# Patient Record
Sex: FEMALE | Race: WHITE | NOT HISPANIC OR LATINO | URBAN - METROPOLITAN AREA
[De-identification: names, ages, dates, MRNs, and addresses within clinical notes are randomized per-mention and may not be internally consistent; named-entity substitution may affect disease eponyms.]

---

## 2019-10-01 ENCOUNTER — EMERGENCY (EMERGENCY)
Facility: HOSPITAL | Age: 54
LOS: 1 days | Discharge: HOME | End: 2019-10-01
Attending: EMERGENCY MEDICINE
Payer: COMMERCIAL

## 2019-10-01 VITALS
RESPIRATION RATE: 20 BRPM | OXYGEN SATURATION: 97 % | HEIGHT: 67 IN | DIASTOLIC BLOOD PRESSURE: 76 MMHG | TEMPERATURE: 98 F | WEIGHT: 141.1 LBS | HEART RATE: 52 BPM | SYSTOLIC BLOOD PRESSURE: 121 MMHG

## 2019-10-01 PROCEDURE — 99283 EMERGENCY DEPT VISIT LOW MDM: CPT | Mod: 25

## 2019-10-01 PROCEDURE — 12001 RPR S/N/AX/GEN/TRNK 2.5CM/<: CPT

## 2019-10-01 PROCEDURE — 12001 RPR S/N/AX/GEN/TRNK 2.5CM/<: CPT | Mod: F6

## 2019-10-01 RX ORDER — IBUPROFEN 200 MG
1 TABLET ORAL
Qty: 20 | Refills: 0
Start: 2019-10-01 | End: 2019-10-05

## 2019-10-01 NOTE — ED PROVIDER NOTE - ATTENDING CONTRIBUTION TO CARE
55 yo F Pt, UTD tetanus, presents to ED complaining of finger laceration x 1 hr. Pt states she cut her right pointer finger on a  while cooking.  from finger  laceration finger no tendon plan wound closure

## 2019-10-01 NOTE — ED ADULT TRIAGE NOTE - CHIEF COMPLAINT QUOTE
pt c/o was pureeing stuck her finger into clean it out.   multiple laceration,cleaned with peroxide.

## 2019-10-01 NOTE — ED PROVIDER NOTE - PATIENT PORTAL LINK FT
You can access the FollowMyHealth Patient Portal offered by [facility] by registering at the following website: [email or web address]. By joining [employer]’s FollowMyHealth portal, you will also be able to view your health information using other applications (apps) compatible with our system.

## 2019-10-01 NOTE — ED PROVIDER NOTE - OBJECTIVE STATEMENT
55 yo F Pt, UTD tetanus, presents to ED complaining of finger laceration x 1 hr. Pt states she cut her right pointer finger on a  while cooking. Pt admits to bleeding. Pt denies numbness, tingling, weakness, and any other acute symptoms at this time.

## 2019-10-01 NOTE — ED PROVIDER NOTE - PROGRESS NOTE DETAILS
laceration repaired without complication. Pt educated on wound care. PT verbalized understanding of diagnosis and importance of follow up at PMD. PT educated on importance of follow up and when to return to the ED. pinsky f/u given as needed

## 2020-07-18 ENCOUNTER — INPATIENT (INPATIENT)
Facility: HOSPITAL | Age: 55
LOS: 3 days | Discharge: HOME | DRG: 392 | End: 2020-07-22
Attending: FAMILY MEDICINE | Admitting: FAMILY MEDICINE
Payer: COMMERCIAL

## 2020-07-18 VITALS
OXYGEN SATURATION: 99 % | WEIGHT: 145.95 LBS | HEIGHT: 64 IN | HEART RATE: 76 BPM | RESPIRATION RATE: 18 BRPM | SYSTOLIC BLOOD PRESSURE: 116 MMHG | TEMPERATURE: 98 F | DIASTOLIC BLOOD PRESSURE: 78 MMHG

## 2020-07-18 DIAGNOSIS — R89.9 UNSPECIFIED ABNORMAL FINDING IN SPECIMENS FROM OTHER ORGANS, SYSTEMS AND TISSUES: ICD-10-CM

## 2020-07-18 LAB
ALBUMIN SERPL ELPH-MCNC: 4.3 G/DL — SIGNIFICANT CHANGE UP (ref 3.3–5.2)
ALP SERPL-CCNC: 53 U/L — SIGNIFICANT CHANGE UP (ref 40–120)
ALT FLD-CCNC: 16 U/L — SIGNIFICANT CHANGE UP
ANION GAP SERPL CALC-SCNC: 12 MMOL/L — SIGNIFICANT CHANGE UP (ref 5–17)
ANISOCYTOSIS BLD QL: SLIGHT — SIGNIFICANT CHANGE UP
APPEARANCE UR: CLEAR — SIGNIFICANT CHANGE UP
AST SERPL-CCNC: 28 U/L — SIGNIFICANT CHANGE UP
BACTERIA # UR AUTO: ABNORMAL
BASOPHILS # BLD AUTO: 0.02 K/UL — SIGNIFICANT CHANGE UP (ref 0–0.2)
BASOPHILS NFR BLD AUTO: 0.9 % — SIGNIFICANT CHANGE UP (ref 0–2)
BILIRUB SERPL-MCNC: 0.3 MG/DL — LOW (ref 0.4–2)
BILIRUB UR-MCNC: NEGATIVE — SIGNIFICANT CHANGE UP
BLASTS # FLD: 0.9 % — HIGH (ref 0–0)
BUN SERPL-MCNC: 15 MG/DL — SIGNIFICANT CHANGE UP (ref 8–20)
CALCIUM SERPL-MCNC: 9.5 MG/DL — SIGNIFICANT CHANGE UP (ref 8.6–10.2)
CHLORIDE SERPL-SCNC: 100 MMOL/L — SIGNIFICANT CHANGE UP (ref 98–107)
CO2 SERPL-SCNC: 26 MMOL/L — SIGNIFICANT CHANGE UP (ref 22–29)
COLOR SPEC: YELLOW — SIGNIFICANT CHANGE UP
CREAT SERPL-MCNC: 0.68 MG/DL — SIGNIFICANT CHANGE UP (ref 0.5–1.3)
DIFF PNL FLD: ABNORMAL
ELLIPTOCYTES BLD QL SMEAR: SLIGHT — SIGNIFICANT CHANGE UP
EOSINOPHIL # BLD AUTO: 0.02 K/UL — SIGNIFICANT CHANGE UP (ref 0–0.5)
EOSINOPHIL NFR BLD AUTO: 0.9 % — SIGNIFICANT CHANGE UP (ref 0–6)
EPI CELLS # UR: ABNORMAL
GIANT PLATELETS BLD QL SMEAR: PRESENT — SIGNIFICANT CHANGE UP
GLUCOSE SERPL-MCNC: 100 MG/DL — HIGH (ref 70–99)
GLUCOSE UR QL: NEGATIVE — SIGNIFICANT CHANGE UP
HCG SERPL-ACNC: <4 MIU/ML — SIGNIFICANT CHANGE UP
HCT VFR BLD CALC: 44.8 % — SIGNIFICANT CHANGE UP (ref 34.5–45)
HGB BLD-MCNC: 14.7 G/DL — SIGNIFICANT CHANGE UP (ref 11.5–15.5)
KETONES UR-MCNC: ABNORMAL
LACTATE BLDV-MCNC: 0.6 MMOL/L — SIGNIFICANT CHANGE UP (ref 0.5–2)
LACTATE BLDV-MCNC: 1.1 MMOL/L — SIGNIFICANT CHANGE UP (ref 0.5–2)
LEUKOCYTE ESTERASE UR-ACNC: ABNORMAL
LIDOCAIN IGE QN: 19 U/L — LOW (ref 22–51)
LYMPHOCYTES # BLD AUTO: 0.75 K/UL — LOW (ref 1–3.3)
LYMPHOCYTES # BLD AUTO: 28.7 % — SIGNIFICANT CHANGE UP (ref 13–44)
MACROCYTES BLD QL: SLIGHT — SIGNIFICANT CHANGE UP
MANUAL SMEAR VERIFICATION: SIGNIFICANT CHANGE UP
MCHC RBC-ENTMCNC: 29.4 PG — SIGNIFICANT CHANGE UP (ref 27–34)
MCHC RBC-ENTMCNC: 32.8 GM/DL — SIGNIFICANT CHANGE UP (ref 32–36)
MCV RBC AUTO: 89.6 FL — SIGNIFICANT CHANGE UP (ref 80–100)
MICROCYTES BLD QL: SLIGHT — SIGNIFICANT CHANGE UP
MONOCYTES # BLD AUTO: 0.46 K/UL — SIGNIFICANT CHANGE UP (ref 0–0.9)
MONOCYTES NFR BLD AUTO: 17.6 % — HIGH (ref 2–14)
MYELOCYTES NFR BLD: 0.9 % — HIGH (ref 0–0)
NEUTROPHILS # BLD AUTO: 1.05 K/UL — LOW (ref 1.8–7.4)
NEUTROPHILS NFR BLD AUTO: 31.5 % — LOW (ref 43–77)
NEUTS BAND # BLD: 8.4 % — HIGH (ref 0–8)
NITRITE UR-MCNC: POSITIVE
OVALOCYTES BLD QL SMEAR: SLIGHT — SIGNIFICANT CHANGE UP
PH UR: 6 — SIGNIFICANT CHANGE UP (ref 5–8)
PLAT MORPH BLD: NORMAL — SIGNIFICANT CHANGE UP
PLATELET # BLD AUTO: 144 K/UL — LOW (ref 150–400)
PLATELET COUNT - ESTIMATE: NORMAL — SIGNIFICANT CHANGE UP
POLYCHROMASIA BLD QL SMEAR: SLIGHT — SIGNIFICANT CHANGE UP
POTASSIUM SERPL-MCNC: 4.6 MMOL/L — SIGNIFICANT CHANGE UP (ref 3.5–5.3)
POTASSIUM SERPL-SCNC: 4.6 MMOL/L — SIGNIFICANT CHANGE UP (ref 3.5–5.3)
PROT SERPL-MCNC: 7 G/DL — SIGNIFICANT CHANGE UP (ref 6.6–8.7)
PROT UR-MCNC: 30 MG/DL
RBC # BLD: 5 M/UL — SIGNIFICANT CHANGE UP (ref 3.8–5.2)
RBC # FLD: 12.7 % — SIGNIFICANT CHANGE UP (ref 10.3–14.5)
RBC BLD AUTO: NORMAL — SIGNIFICANT CHANGE UP
RBC CASTS # UR COMP ASSIST: SIGNIFICANT CHANGE UP /HPF (ref 0–4)
SMUDGE CELLS # BLD: PRESENT — SIGNIFICANT CHANGE UP
SODIUM SERPL-SCNC: 138 MMOL/L — SIGNIFICANT CHANGE UP (ref 135–145)
SP GR SPEC: 1.02 — SIGNIFICANT CHANGE UP (ref 1.01–1.02)
UROBILINOGEN FLD QL: NEGATIVE — SIGNIFICANT CHANGE UP
VARIANT LYMPHS # BLD: 10.2 % — HIGH (ref 0–6)
WBC # BLD: 2.62 K/UL — LOW (ref 3.8–10.5)
WBC # FLD AUTO: 2.62 K/UL — LOW (ref 3.8–10.5)
WBC UR QL: ABNORMAL

## 2020-07-18 PROCEDURE — 74177 CT ABD & PELVIS W/CONTRAST: CPT | Mod: 26

## 2020-07-18 PROCEDURE — 99285 EMERGENCY DEPT VISIT HI MDM: CPT

## 2020-07-18 PROCEDURE — 99223 1ST HOSP IP/OBS HIGH 75: CPT

## 2020-07-18 RX ORDER — SODIUM CHLORIDE 9 MG/ML
1000 INJECTION, SOLUTION INTRAVENOUS
Refills: 0 | Status: DISCONTINUED | OUTPATIENT
Start: 2020-07-18 | End: 2020-07-20

## 2020-07-18 RX ORDER — CIPROFLOXACIN LACTATE 400MG/40ML
400 VIAL (ML) INTRAVENOUS EVERY 12 HOURS
Refills: 0 | Status: DISCONTINUED | OUTPATIENT
Start: 2020-07-18 | End: 2020-07-22

## 2020-07-18 RX ORDER — CEFTRIAXONE 500 MG/1
1000 INJECTION, POWDER, FOR SOLUTION INTRAMUSCULAR; INTRAVENOUS ONCE
Refills: 0 | Status: COMPLETED | OUTPATIENT
Start: 2020-07-18 | End: 2020-07-18

## 2020-07-18 RX ORDER — LANOLIN ALCOHOL/MO/W.PET/CERES
3 CREAM (GRAM) TOPICAL AT BEDTIME
Refills: 0 | Status: DISCONTINUED | OUTPATIENT
Start: 2020-07-18 | End: 2020-07-22

## 2020-07-18 RX ORDER — IOHEXOL 300 MG/ML
30 INJECTION, SOLUTION INTRAVENOUS ONCE
Refills: 0 | Status: COMPLETED | OUTPATIENT
Start: 2020-07-18 | End: 2020-07-18

## 2020-07-18 RX ORDER — ENOXAPARIN SODIUM 100 MG/ML
40 INJECTION SUBCUTANEOUS DAILY
Refills: 0 | Status: DISCONTINUED | OUTPATIENT
Start: 2020-07-18 | End: 2020-07-22

## 2020-07-18 RX ORDER — SODIUM CHLORIDE 9 MG/ML
1000 INJECTION INTRAMUSCULAR; INTRAVENOUS; SUBCUTANEOUS ONCE
Refills: 0 | Status: COMPLETED | OUTPATIENT
Start: 2020-07-18 | End: 2020-07-18

## 2020-07-18 RX ORDER — PANTOPRAZOLE SODIUM 20 MG/1
40 TABLET, DELAYED RELEASE ORAL
Refills: 0 | Status: DISCONTINUED | OUTPATIENT
Start: 2020-07-18 | End: 2020-07-22

## 2020-07-18 RX ORDER — ACETAMINOPHEN 500 MG
650 TABLET ORAL EVERY 6 HOURS
Refills: 0 | Status: DISCONTINUED | OUTPATIENT
Start: 2020-07-18 | End: 2020-07-22

## 2020-07-18 RX ORDER — METRONIDAZOLE 500 MG
500 TABLET ORAL EVERY 8 HOURS
Refills: 0 | Status: DISCONTINUED | OUTPATIENT
Start: 2020-07-18 | End: 2020-07-22

## 2020-07-18 RX ADMIN — SODIUM CHLORIDE 1000 MILLILITER(S): 9 INJECTION INTRAMUSCULAR; INTRAVENOUS; SUBCUTANEOUS at 14:08

## 2020-07-18 RX ADMIN — Medication 100 MILLIGRAM(S): at 19:58

## 2020-07-18 RX ADMIN — Medication 3 MILLIGRAM(S): at 23:27

## 2020-07-18 RX ADMIN — SODIUM CHLORIDE 100 MILLILITER(S): 9 INJECTION, SOLUTION INTRAVENOUS at 19:00

## 2020-07-18 RX ADMIN — IOHEXOL 30 MILLILITER(S): 300 INJECTION, SOLUTION INTRAVENOUS at 13:35

## 2020-07-18 RX ADMIN — CEFTRIAXONE 100 MILLIGRAM(S): 500 INJECTION, POWDER, FOR SOLUTION INTRAMUSCULAR; INTRAVENOUS at 14:08

## 2020-07-18 RX ADMIN — Medication 200 MILLIGRAM(S): at 17:56

## 2020-07-18 RX ADMIN — CEFTRIAXONE 1000 MILLIGRAM(S): 500 INJECTION, POWDER, FOR SOLUTION INTRAMUSCULAR; INTRAVENOUS at 17:42

## 2020-07-18 RX ADMIN — ENOXAPARIN SODIUM 40 MILLIGRAM(S): 100 INJECTION SUBCUTANEOUS at 19:00

## 2020-07-18 RX ADMIN — SODIUM CHLORIDE 1000 MILLILITER(S): 9 INJECTION INTRAMUSCULAR; INTRAVENOUS; SUBCUTANEOUS at 13:32

## 2020-07-18 NOTE — ED STATDOCS - ATTENDING CONTRIBUTION TO CARE
I, Caridad Monk, performed a face to face bedside interview with this patient regarding history of present illness, review of symptoms and relevant past medical, social and family history.  I completed an independent physical examination. Medical decision making, follow-up on ordered tests (ie labs, radiologic studies) and re-evaluation of the patient's status has been communicated to the ACP.  Disposition of the patient will be based on test outcome and response to ED interventions.

## 2020-07-18 NOTE — ED STATDOCS - CARE PLAN
Principal Discharge DX:	Abnormal laboratory test  Secondary Diagnosis:	UTI (urinary tract infection)  Secondary Diagnosis:	Diverticulitis

## 2020-07-18 NOTE — H&P ADULT - ASSESSMENT
54 F came with abd pain,n/v/d ct abd suspected colitis      Abdominal pain,nausea,vomiting ,diarrhea sec to colitis could be infective/ inflamatory    -NPO  -IVF  -IV Cipro,flagyl  - stool ova,paracyte  -c/s GI  -Reviewed ct abd/pelvis  -Leukopenia,will repeat cbc am  -pt does not have any fever now  -blood c/s.    Abnomal UA r/o UTI  - IV CIPRO. F/U urine c/s      Abnormal Peripheral smear with blast cell  -c/s hematology    dvt ppx  gi ppx  care of plan dw pt  pt agreed with above plan. 54 F came with abd pain,n/v/d ct abd suspected colitis      Abdominal pain,nausea,vomiting ,diarrhea,fever sec to colitis could be infective/ inflamatory    -NPO  -IVF  -IV Cipro,flagyl  - stool ova,paracyte  -c/s GI  -Reviewed ct abd/pelvis  -Leukopenia with bandemia repeat cbc am  -pt does not have any fever now  -blood c/s. monitor temp  -lactic acid    Abnomal UA r/o UTI  - IV CIPRO. F/U urine c/s      Abnormal Peripheral smear with blast cell,leukopenia  -c/s hematology    dvt ppx  gi ppx  care of plan dw pt  pt agreed with above plan.

## 2020-07-18 NOTE — H&P ADULT - HISTORY OF PRESENT ILLNESS
55 y/o F pt with significant PMHx of urinary bladder stone, sling  presents to the ED c/o non-bloody diarrhea for the last 3 days with a 102tmax fever 2 days ago. She is also c/o abdominal pain described as cramping.  Pt states that she stopped eating which has stopped the diarrhea. Denies urinary symptoms, bloody stool, and vomiting. Went to  prior to ED and was told she should be evaluated for diverticulitis.  Pt states she ate turkey burger at restaurant 5 days ago, diarrhea started after that. She also took some wt loss pills.  No fever last 24 hr. c/o nausea but no vomiting present. pt states every time she is eating she is having diarrhea. She is c/o fatigue. Dark,foul smell urine but no dysuria or back pain. c/o abdominal cramp.         PMH,PSH:   Urinary bladder sling, stone  Breast implant    Allergy  pcn    Family history:  Father -colon cancer  Mother-htn,heart disease    Socila history:   Denies active smoking, alcohol/illicit drug uses ,was remote smoker.

## 2020-07-18 NOTE — ED STATDOCS - PHYSICAL EXAMINATION
Gen: NAD, AOx3  Head: NCAT  Lung: CTAB, no respiratory distress, no wheezing, rales, rhonchi  CV: normal s1/s2, rrr, no murmurs, Normal perfusion, pulses 2+ throughout  Abd: +TTP LLQ no, rebound guarding or rigidity   MSK: No edema, no visible deformities, full range of motion in all 4 extremities  Neuro: CN II-XII grossly intact, No focal neurologic deficits  Skin: No rash   Psych: normal affect Gen: NAD, AOx3  Head: NCAT  Lung: CTAB, no respiratory distress, no wheezing, rales, rhonchi  CV: normal s1/s2, rrr, no murmurs, Normal perfusion, pulses 2+ throughout  Abd: +TTP LLQ no, rebound guarding or rigidity   MSK: No edema, no visible deformities, full range of motion in all 4 extremities  Neuro: No focal neurologic deficits  Skin: No rash   Psych: normal affect

## 2020-07-18 NOTE — H&P ADULT - NSHPPHYSICALEXAM_GEN_ALL_CORE
T(C): 36.4 (07-18-20 @ 12:32), Max: 36.4 (07-18-20 @ 12:32)  HR: 76 (07-18-20 @ 12:32) (76 - 76)  BP: 116/78 (07-18-20 @ 12:32) (116/78 - 116/78)  RR: 18 (07-18-20 @ 12:32) (18 - 18)  SpO2: 99% (07-18-20 @ 12:32) (99% - 99%)    GEN - NAD  HEENT - NCAT, EOMI, TERI, no JVD/bruit.  RESP - CTA BL, no wheeze/stridor/rhonchi/crackles. not on supplemental O2.  CARDIO - NS1S2, RRR. No murmurs/rubs/gallops.  ABD - Soft. tender lower abdomen, r/Non distended. Normal BS x4 quadrants.   Ext - No SHARON. no signs of venous/arterial stasis ulcers  MSK - full ROM of BL upper and lower extremities without pain or restriction. BL 5/5 strength on upper and lower extremities.   Neuro - cn 2-12 grossly intact. cerebellar function intact. no visible seizure activity appreciated. no tremor. gait not observed.   Skin - clean, dry, intact. no rashes or lesions.    Psych- AAOx3. no suicidal/homicidal ideation. appropriate behaviour. attentive. normal affect.

## 2020-07-18 NOTE — ED STATDOCS - CLINICAL SUMMARY MEDICAL DECISION MAKING FREE TEXT BOX
55 y/o F presents with abdominal pain and diarrhea +TTP LLQ, check labs CT, eval for diverticulitis vs colitis. Check labs, give fluids and reassess.

## 2020-07-18 NOTE — ED STATDOCS - OBJECTIVE STATEMENT
55 y/o F pt with significant PMHx of gallstones presents to the ED c/o diarrhea for the last 3 days with a 102tmax fever 2 days ago. She is also c/o abdominal pain described as cramping.  Pt states that she stopped eating which has stopped the diarrhea. Denies urinary symptoms, bloody stool, and vomiting 55 y/o F pt with significant PMHx of gallstones presents to the ED c/o non-bloody diarrhea for the last 3 days with a 102tmax fever 2 days ago. She is also c/o abdominal pain described as cramping.  Pt states that she stopped eating which has stopped the diarrhea. Denies urinary symptoms, bloody stool, and vomiting. Went to UC prior to ED and was told she should be evaluated for diverticulitis.

## 2020-07-19 PROBLEM — Z78.9 OTHER SPECIFIED HEALTH STATUS: Chronic | Status: ACTIVE | Noted: 2019-10-01

## 2020-07-19 LAB
ANION GAP SERPL CALC-SCNC: 10 MMOL/L — SIGNIFICANT CHANGE UP (ref 5–17)
BASOPHILS # BLD AUTO: 0.01 K/UL — SIGNIFICANT CHANGE UP (ref 0–0.2)
BASOPHILS NFR BLD AUTO: 0.4 % — SIGNIFICANT CHANGE UP (ref 0–2)
BUN SERPL-MCNC: 8 MG/DL — SIGNIFICANT CHANGE UP (ref 8–20)
CALCIUM SERPL-MCNC: 8.6 MG/DL — SIGNIFICANT CHANGE UP (ref 8.6–10.2)
CHLORIDE SERPL-SCNC: 107 MMOL/L — SIGNIFICANT CHANGE UP (ref 98–107)
CO2 SERPL-SCNC: 25 MMOL/L — SIGNIFICANT CHANGE UP (ref 22–29)
CREAT SERPL-MCNC: 0.57 MG/DL — SIGNIFICANT CHANGE UP (ref 0.5–1.3)
EOSINOPHIL # BLD AUTO: 0.05 K/UL — SIGNIFICANT CHANGE UP (ref 0–0.5)
EOSINOPHIL NFR BLD AUTO: 1.8 % — SIGNIFICANT CHANGE UP (ref 0–6)
GLUCOSE SERPL-MCNC: 144 MG/DL — HIGH (ref 70–99)
HCT VFR BLD CALC: 35.6 % — SIGNIFICANT CHANGE UP (ref 34.5–45)
HCV AB S/CO SERPL IA: 15.22 S/CO — HIGH (ref 0–0.99)
HCV AB SERPL-IMP: REACTIVE
HGB BLD-MCNC: 11.5 G/DL — SIGNIFICANT CHANGE UP (ref 11.5–15.5)
IMM GRANULOCYTES NFR BLD AUTO: 0.4 % — SIGNIFICANT CHANGE UP (ref 0–1.5)
LYMPHOCYTES # BLD AUTO: 1.42 K/UL — SIGNIFICANT CHANGE UP (ref 1–3.3)
LYMPHOCYTES # BLD AUTO: 51.3 % — HIGH (ref 13–44)
MCHC RBC-ENTMCNC: 29.3 PG — SIGNIFICANT CHANGE UP (ref 27–34)
MCHC RBC-ENTMCNC: 32.3 GM/DL — SIGNIFICANT CHANGE UP (ref 32–36)
MCV RBC AUTO: 90.8 FL — SIGNIFICANT CHANGE UP (ref 80–100)
MONOCYTES # BLD AUTO: 0.48 K/UL — SIGNIFICANT CHANGE UP (ref 0–0.9)
MONOCYTES NFR BLD AUTO: 17.3 % — HIGH (ref 2–14)
NEUTROPHILS # BLD AUTO: 0.8 K/UL — LOW (ref 1.8–7.4)
NEUTROPHILS NFR BLD AUTO: 28.8 % — LOW (ref 43–77)
PLATELET # BLD AUTO: 127 K/UL — LOW (ref 150–400)
POTASSIUM SERPL-MCNC: 4.2 MMOL/L — SIGNIFICANT CHANGE UP (ref 3.5–5.3)
POTASSIUM SERPL-SCNC: 4.2 MMOL/L — SIGNIFICANT CHANGE UP (ref 3.5–5.3)
RBC # BLD: 3.92 M/UL — SIGNIFICANT CHANGE UP (ref 3.8–5.2)
RBC # FLD: 12.8 % — SIGNIFICANT CHANGE UP (ref 10.3–14.5)
SARS-COV-2 RNA SPEC QL NAA+PROBE: SIGNIFICANT CHANGE UP
SODIUM SERPL-SCNC: 142 MMOL/L — SIGNIFICANT CHANGE UP (ref 135–145)
WBC # BLD: 2.77 K/UL — LOW (ref 3.8–10.5)
WBC # FLD AUTO: 2.77 K/UL — LOW (ref 3.8–10.5)

## 2020-07-19 PROCEDURE — 99222 1ST HOSP IP/OBS MODERATE 55: CPT

## 2020-07-19 PROCEDURE — 99233 SBSQ HOSP IP/OBS HIGH 50: CPT

## 2020-07-19 PROCEDURE — 99223 1ST HOSP IP/OBS HIGH 75: CPT

## 2020-07-19 RX ORDER — LANOLIN ALCOHOL/MO/W.PET/CERES
2 CREAM (GRAM) TOPICAL AT BEDTIME
Refills: 0 | Status: DISCONTINUED | OUTPATIENT
Start: 2020-07-19 | End: 2020-07-22

## 2020-07-19 RX ADMIN — SODIUM CHLORIDE 100 MILLILITER(S): 9 INJECTION, SOLUTION INTRAVENOUS at 16:43

## 2020-07-19 RX ADMIN — Medication 100 MILLIGRAM(S): at 11:26

## 2020-07-19 RX ADMIN — SODIUM CHLORIDE 100 MILLILITER(S): 9 INJECTION, SOLUTION INTRAVENOUS at 05:29

## 2020-07-19 RX ADMIN — Medication 200 MILLIGRAM(S): at 05:29

## 2020-07-19 RX ADMIN — Medication 100 MILLIGRAM(S): at 22:16

## 2020-07-19 RX ADMIN — Medication 2 MILLIGRAM(S): at 01:37

## 2020-07-19 RX ADMIN — PANTOPRAZOLE SODIUM 40 MILLIGRAM(S): 20 TABLET, DELAYED RELEASE ORAL at 05:30

## 2020-07-19 RX ADMIN — Medication 100 MILLIGRAM(S): at 03:13

## 2020-07-19 RX ADMIN — Medication 200 MILLIGRAM(S): at 15:09

## 2020-07-19 NOTE — PATIENT PROFILE ADULT - VISION (WITH CORRECTIVE LENSES IF THE PATIENT USUALLY WEARS THEM):
Partially impaired: cannot see medication labels or newsprint, but can see obstacles in path, and the surrounding layout; can count fingers at arm's length/ptx with a pair of glasses

## 2020-07-19 NOTE — CONSULT NOTE ADULT - SUBJECTIVE AND OBJECTIVE BOX
HISTORY OF PRESENT ILLNESS:  This is a 54y old Female with a past medical history significant for urinary bladder stone, sling  presents to the ER with nausea, vomiting and diarrhea for 3 days.  She had chicken at a diner and a turkey host dog a few days ago. She had nausea and several episodes of diarrhea. She states she had a fever. She stopped eating and the diarrhea seems to have calmed down but it restarted once she started any oral intake. She had one episode of vomiting.  She has recently started taking nutrifast shakes and hydroxycut pills for weight loss a few days prior. No fever last 24 hr.  She was recently on antibiotics for a UTI. She has had some diarrhea while she has been admitted but no fever or vomiting. She continues to have some lower abdominal pain. She had a colonoscopy a few years ago which was normal.   In the ED she had neutropenia. CT a/p showed colitis and ileal thickening. Heme onc was consulted because the patient was noted to have abnl CBC with diff with bands and blasts. flow cytometry pending.     REVIEW OF SYSTEMS:  Constitutional:  No unintentional weight loss, fevers, chills or night sweats	  Eyes: No eye pain, redness, discharge, or proptosis  ENMT: No sore throat, ear pain, mouth sores, or swollen glands in the neck  Respiratory: No dyspnea, cough or wheezing  Cardiovascular: No chest pain, dyspnea on exertion, or orthopnea  Gastrointestinal:	Please see HPI  Genitourinary: No dysuria or hematuria  Neurological:	 No changes in sleep/wake cycle, convulsions, confusion, dizziness or lightheadedness  Psychiatric: No changes in personality or emotional problems   Hematology: No easy bruising   Endocrine: No hot or cold flashes or deepening of voice	  All other review of systems were completed and were otherwise negative save what is reported in the HPI.    PAST MEDICAL/SURGICAL HISTORY:  No pertinent past medical history  No significant past surgical history    SOCIAL HISTORY:  - ILLICIT DRUG USE: Denies    FAMILY HISTORY:  No known history of gastrointestinal or liver disease;      HOME MEDICATIONS:    INPATIENT MEDICATIONS:  MEDICATIONS  (STANDING):  ciprofloxacin   IVPB 400 milliGRAM(s) IV Intermittent every 12 hours  dextrose 5% + sodium chloride 0.9%. 1000 milliLiter(s) (100 mL/Hr) IV Continuous <Continuous>  enoxaparin Injectable 40 milliGRAM(s) SubCutaneous daily  melatonin 3 milliGRAM(s) Oral at bedtime  melatonin 2 milliGRAM(s) Oral at bedtime  metroNIDAZOLE  IVPB 500 milliGRAM(s) IV Intermittent every 8 hours  pantoprazole    Tablet 40 milliGRAM(s) Oral before breakfast    MEDICATIONS  (PRN):  acetaminophen   Tablet .. 650 milliGRAM(s) Oral every 6 hours PRN Moderate Pain (4 - 6)    ALLERGIES:  amoxicillin (Hives)    VITAL SIGNS LAST 24 HOURS:  T(C): 36.5 (2020 11:57), Max: 36.8 (2020 19:41)  T(F): 97.7 (2020 11:57), Max: 98.2 (2020 19:41)  HR: 49 (2020 11:57) (49 - 66)  BP: 118/77 (2020 11:57) (99/58 - 118/77)  BP(mean): 72 (2020 04:29) (72 - 72)  RR: 18 (2020 11:57) (16 - 19)  SpO2: 100% (2020 11:57) (96% - 100%)      20 @ 07:  -  20 @ 07:00  --------------------------------------------------------  IN: 900 mL / OUT: 0 mL / NET: 900 mL        20 @ 07:  -  20 @ 07:00  --------------------------------------------------------  IN: 900 mL / OUT: 0 mL / NET: 900 mL      PHYSICAL EXAM:  Constitutional: Well-developed, well-nourished, in no apparent distress  Eyes: Sclerae anicteric, conjunctivae normal  Neck: No thyroid nodules appreciated, no significant cervical or supraclavicular lymphadenopathy  Respiratory: Breathing nonlabored; clear to auscultation  Cardiovascular: Regular rate and rhythm  Gastrointestinal: Soft, lower tenderness nondistended, normoactive bowel sounds  Extremities: No clubbing, cyanosis or edema  Neurological: Alert and oriented to person, place and time; no asterixis  Skin: No jaundice  Psychiatric: Affect and mood appropriate      LABS:                        11.5   2.77  )-----------( 127      ( 2020 06:30 )             35.6       07-19    142  |  107  |  8.0  ----------------------------<  144<H>  4.2   |  25.0  |  0.57    Ca    8.6      2020 06:30    TPro  7.0  /  Alb  4.3  /  TBili  0.3<L>  /  DBili  x   /  AST  28  /  ALT  16  /  AlkPhos  53  07-18    LIVER FUNCTIONS - ( 2020 13:06 )  Alb: 4.3 g/dL / Pro: 7.0 g/dL / ALK PHOS: 53 U/L / ALT: 16 U/L / AST: 28 U/L / GGT: x           Urinalysis Basic - ( 2020 13:21 )    Color: Yellow / Appearance: Clear / S.020 / pH: x  Gluc: x / Ketone: Trace  / Bili: Negative / Urobili: Negative   Blood: x / Protein: 30 mg/dL / Nitrite: Positive   Leuk Esterase: Small / RBC: 0-2 /HPF / WBC 26-50   Sq Epi: x / Non Sq Epi: Moderate / Bacteria: Many      IMAGING:  < from: CT Abdomen and Pelvis w/ Oral Cont and w/ IV Cont (20 @ 16:14) >  FINDINGS:     Bilateral breast implants are seen    Lung bases are clear.    Liver, gallbladder, spleen, pancreas, adrenal glands, and kidneys are unremarkable. No aortic aneurysm. Mesenteric vasculature is patent. No small bowel obstruction. No adenopathy.    Gynecological structures are present. Small amount of dependent ascites in the pelvis.    There is wall thickening of the colon, predominantly the hepatic flexure through the transverse colon.    The terminal ileum is also thickened.    No free air. No abscess.  Urinary bladder is unremarkable.    Appendix is unremarkable    IMPRESSION:     Wall thickening of the terminal ileum, and colon predominantly involving the hepatic flexure through transverse colon. Differential includes an infectious/inflammatory process(IBD).    < end of copied text >

## 2020-07-19 NOTE — CONSULT NOTE ADULT - ASSESSMENT
54y old Female with a past medical history significant for urinary bladder stone, sling  presents to the ER with nausea, vomiting and diarrhea for 3 days. Fever.   CT with colitis   - clears  - GI PCR, C diff PCR, stool O &P  - cont Cipro and flagyl       Thank you

## 2020-07-19 NOTE — PROGRESS NOTE ADULT - SUBJECTIVE AND OBJECTIVE BOX
Patient is a 54y old  Female who presents with a chief complaint of   Pt seen and exam. No nausea,vomiting ,diarrhea present today.  No abd pain,fever,chill,cp,sob,dysuria. c/o fatigue    SUBJECTIVE / OVERNIGHT EVENTS: none  REVIEW OF SYSTEMS: All systems are reviewed and found to be negative except above    MEDICATIONS  (STANDING):  ciprofloxacin   IVPB 400 milliGRAM(s) IV Intermittent every 12 hours  dextrose 5% + sodium chloride 0.9%. 1000 milliLiter(s) (100 mL/Hr) IV Continuous <Continuous>  enoxaparin Injectable 40 milliGRAM(s) SubCutaneous daily  melatonin 3 milliGRAM(s) Oral at bedtime  melatonin 2 milliGRAM(s) Oral at bedtime  metroNIDAZOLE  IVPB 500 milliGRAM(s) IV Intermittent every 8 hours  pantoprazole    Tablet 40 milliGRAM(s) Oral before breakfast    MEDICATIONS  (PRN):  acetaminophen   Tablet .. 650 milliGRAM(s) Oral every 6 hours PRN Moderate Pain (4 - 6)      CAPILLARY BLOOD GLUCOSE        I&O's Summary    2020 07:01  -  2020 07:00  --------------------------------------------------------  IN: 900 mL / OUT: 0 mL / NET: 900 mL        PHYSICAL EXAM:  Vital Signs Last 24 Hrs  T(C): 36.6 (2020 07:34), Max: 36.8 (2020 19:41)  T(F): 97.8 (2020 07:34), Max: 98.2 (2020 19:41)  HR: 51 (2020 07:34) (51 - 76)  BP: 115/72 (2020 07:34) (99/58 - 116/78)  BP(mean): 72 (2020 04:29) (72 - 72)  RR: 16 (2020 07:34) (16 - 19)  SpO2: 97% (2020 07:34) (96% - 99%)    CONSTITUTIONAL: NAD, well-developed  EYES: PERRLA; conjunctiva and sclera clear  ENMT: Moist oral mucosa, no pharyngeal injection or exudates; normal dentition  NECK: Supple, no palpable masses; no thyromegaly  RESPIRATORY: Normal respiratory effort; lungs are clear to auscultation bilaterally  CARDIOVASCULAR: Regular rate and rhythm, normal S1 and S2, no murmur   EXTS: No lower extremity edema; Peripheral pulses are 2+ bilaterally  ABDOMEN: Nontender to palpation, normoactive bowel sounds, no rebound/guarding; No hepatosplenomegaly  MUSCLOSKELETAL: no clubbing or cyanosis of digits; no joint swelling or tenderness to palpation  PSYCH: affect appropriate  NEUROLOGY: A+O to person, place, and time; CN 2-12 are intact and symmetric; no gross sensory deficits;   SKIN: No rashes;     LABS:                        14.7   2.62  )-----------( 144      ( 2020 13:06 )             44.8     07-19    142  |  107  |  8.0  ----------------------------<  144<H>  4.2   |  25.0  |  0.57    Ca    8.6      2020 06:30    TPro  7.0  /  Alb  4.3  /  TBili  0.3<L>  /  DBili  x   /  AST  28  /  ALT  16  /  AlkPhos  53  07-18          Urinalysis Basic - ( 2020 13:21 )    Color: Yellow / Appearance: Clear / S.020 / pH: x  Gluc: x / Ketone: Trace  / Bili: Negative / Urobili: Negative   Blood: x / Protein: 30 mg/dL / Nitrite: Positive   Leuk Esterase: Small / RBC: 0-2 /HPF / WBC 26-50   Sq Epi: x / Non Sq Epi: Moderate / Bacteria: Many          RADIOLOGY & ADDITIONAL TESTS:  Results Reviewed:   Imaging Personally Reviewed:  Electrocardiogram Personally Reviewed:    COORDINATION OF CARE:  Care Discussed with Consultants/Other Providers [Y/N]:  Prior or Outpatient Records Reviewed [Y/N]:

## 2020-07-19 NOTE — CONSULT NOTE ADULT - SUBJECTIVE AND OBJECTIVE BOX
HPI:  53 y/o F pt with significant PMHx of urinary bladder stone, sling  presents to the ED c/o non-bloody diarrhea for the last 3 days with a 102tmax fever 2 days ago. She is also c/o abdominal pain described as cramping.  Pt states that she stopped eating which has stopped the diarrhea. Denies urinary symptoms, bloody stool, and vomiting. Went to  prior to ED and was told she should be evaluated for diverticulitis.  Pt states she ate turkey burger at restaurant 5 days ago, diarrhea started after that. She also took some wt loss pills.  No fever last 24 hr. c/o nausea but no vomiting present. pt states every time she is eating she is having diarrhea. She is c/o fatigue. Dark,foul smell urine but no dysuria or back pain. c/o abdominal cramp.     Admission WBC 2.6, with 31 polys, 28 lymphs, 10 reactive lymphs, 17 monocytes, 1 eo, 1 basophil, 1 myelocyte, 1 blast.  Hemoglobin 14.7, platelets 144,000.        PMH,PSH:   Urinary bladder sling, stone  Breast implant    Allergy  pcn    Family history:  Father -colon cancer  Mother-htn,heart disease    Socila history:   Denies active smoking, alcohol/illicit drug uses ,was remote smoker. (18 Jul 2020 17:37)      REVIEW OF SYSTEMS:    HEME/LYMPH: No easy bruising, or bleeding     PAST MEDICAL & SURGICAL HISTORY:  No pertinent past medical history  No significant past surgical history    Allergies    amoxicillin (Hives)    MEDICATIONS  (STANDING):  ciprofloxacin   IVPB 400 milliGRAM(s) IV Intermittent every 12 hours  dextrose 5% + sodium chloride 0.9%. 1000 milliLiter(s) (100 mL/Hr) IV Continuous <Continuous>  enoxaparin Injectable 40 milliGRAM(s) SubCutaneous daily  melatonin 3 milliGRAM(s) Oral at bedtime  melatonin 2 milliGRAM(s) Oral at bedtime  metroNIDAZOLE  IVPB 500 milliGRAM(s) IV Intermittent every 8 hours  pantoprazole    Tablet 40 milliGRAM(s) Oral before breakfast    MEDICATIONS  (PRN):  acetaminophen   Tablet .. 650 milliGRAM(s) Oral every 6 hours PRN Moderate Pain (4 - 6)      Vital Signs Last 24 Hrs  T(C): 36.6 (19 Jul 2020 07:34), Max: 36.8 (18 Jul 2020 19:41)  T(F): 97.8 (19 Jul 2020 07:34), Max: 98.2 (18 Jul 2020 19:41)  HR: 51 (19 Jul 2020 07:34) (51 - 76)  BP: 115/72 (19 Jul 2020 07:34) (99/58 - 116/78)  BP(mean): 72 (19 Jul 2020 04:29) (72 - 72)  RR: 16 (19 Jul 2020 07:34) (16 - 19)  SpO2: 97% (19 Jul 2020 07:34) (96% - 99%)        LABS:                        14.7   2.62  )-----------( 144      ( 18 Jul 2020 13:06 )             44.8     07-19    142  |  107  |  8.0  ----------------------------<  144<H>  4.2   |  25.0  |  0.57    Ca    8.6      19 Jul 2020 06:30    TPro  7.0  /  Alb  4.3  /  TBili  0.3<L>  /  DBili  x   /  AST  28  /  ALT  16  /  AlkPhos  53  07-18        RADIOLOGY & ADDITIONAL STUDIES:       EXAM:  CT ABDOMEN AND PELVIS OC IC                          PROCEDURE DATE:  07/18/2020          INTERPRETATION:  HISTORY: Left lower quadrant tenderness with abdominal pain.    TECHNIQUE: CT of the chest, abdomen and pelvis with contrast.    COMPARISON: none    FINDINGS:     Bilateral breast implants are seen    Lung bases are clear.    Liver, gallbladder, spleen, pancreas, adrenal glands, and kidneys are unremarkable. No aortic aneurysm. Mesenteric vasculature is patent. No small bowel obstruction. No adenopathy.    Gynecological structures are present. Small amount of dependent ascites in the pelvis.    There is wall thickening of the colon, predominantly the hepatic flexure through the transverse colon.    The terminal ileum is also thickened.    No free air. No abscess.  Urinary bladder is unremarkable.    Appendix is unremarkable              IMPRESSION:     Wall thickening of the terminal ileum, and colon predominantly involving the hepatic flexure through transverse colon. Differential includes an infectious/inflammatory process(IBD).

## 2020-07-19 NOTE — CONSULT NOTE ADULT - ASSESSMENT
Neutropenia with left shift most likely is secondary to infectious colitis.  Will request flow cytometry to be drawn in the AM to exclude leukemia but this is unlikely.  Continue to monitor CBC  - expect improvement with antibiotic therapy.

## 2020-07-19 NOTE — PROGRESS NOTE ADULT - ASSESSMENT
54 F came with abd pain,n/v/d ct abd suspected colitis      Abdominal pain,nausea,vomiting ,diarrhea,fever sec to colitis could be infective/ inflamatory    -will start clear liquid  -IVF  -IV Cipro,flagyl  - stool ova,paracyte  -c/s GI  -Reviewed ct abd/pelvis  -pt does not have any fever now  -blood c/s. monitor temp  -lactic acid nl    Abnomal UA r/o UTI  - IV CIPRO. F/U urine c/s    Neutropenia with left shift most likely is secondary to infectious colitis.  Per hem/onc -  flow cytometry to be drawn in the AM to exclude leukemia but this is unlikely.  Continue to monitor CBC  - expect improvement with antibiotic therapy    dvt ppx  gi ppx  care of plan dw pt at bedside  and Partner over phone   pt agreed with above plan.

## 2020-07-20 DIAGNOSIS — K52.9 NONINFECTIVE GASTROENTERITIS AND COLITIS, UNSPECIFIED: ICD-10-CM

## 2020-07-20 LAB
ANION GAP SERPL CALC-SCNC: 12 MMOL/L — SIGNIFICANT CHANGE UP (ref 5–17)
BUN SERPL-MCNC: 4 MG/DL — LOW (ref 8–20)
CALCIUM SERPL-MCNC: 9.4 MG/DL — SIGNIFICANT CHANGE UP (ref 8.6–10.2)
CHLORIDE SERPL-SCNC: 105 MMOL/L — SIGNIFICANT CHANGE UP (ref 98–107)
CO2 SERPL-SCNC: 25 MMOL/L — SIGNIFICANT CHANGE UP (ref 22–29)
CREAT SERPL-MCNC: 0.49 MG/DL — LOW (ref 0.5–1.3)
GLUCOSE SERPL-MCNC: 121 MG/DL — HIGH (ref 70–99)
HCT VFR BLD CALC: 37 % — SIGNIFICANT CHANGE UP (ref 34.5–45)
HGB BLD-MCNC: 12.1 G/DL — SIGNIFICANT CHANGE UP (ref 11.5–15.5)
MCHC RBC-ENTMCNC: 28.8 PG — SIGNIFICANT CHANGE UP (ref 27–34)
MCHC RBC-ENTMCNC: 32.7 GM/DL — SIGNIFICANT CHANGE UP (ref 32–36)
MCV RBC AUTO: 88.1 FL — SIGNIFICANT CHANGE UP (ref 80–100)
PLATELET # BLD AUTO: 144 K/UL — LOW (ref 150–400)
POTASSIUM SERPL-MCNC: 3.8 MMOL/L — SIGNIFICANT CHANGE UP (ref 3.5–5.3)
POTASSIUM SERPL-SCNC: 3.8 MMOL/L — SIGNIFICANT CHANGE UP (ref 3.5–5.3)
RBC # BLD: 4.2 M/UL — SIGNIFICANT CHANGE UP (ref 3.8–5.2)
RBC # FLD: 12.3 % — SIGNIFICANT CHANGE UP (ref 10.3–14.5)
SARS-COV-2 IGG SERPL QL IA: NEGATIVE — SIGNIFICANT CHANGE UP
SARS-COV-2 IGM SERPL IA-ACNC: 0.26 RATIO — SIGNIFICANT CHANGE UP
SODIUM SERPL-SCNC: 142 MMOL/L — SIGNIFICANT CHANGE UP (ref 135–145)
WBC # BLD: 2.93 K/UL — LOW (ref 3.8–10.5)
WBC # FLD AUTO: 2.93 K/UL — LOW (ref 3.8–10.5)

## 2020-07-20 PROCEDURE — 99233 SBSQ HOSP IP/OBS HIGH 50: CPT

## 2020-07-20 PROCEDURE — 88189 FLOWCYTOMETRY/READ 16 & >: CPT

## 2020-07-20 RX ADMIN — Medication 3 MILLIGRAM(S): at 22:27

## 2020-07-20 RX ADMIN — Medication 100 MILLIGRAM(S): at 14:00

## 2020-07-20 RX ADMIN — Medication 100 MILLIGRAM(S): at 06:08

## 2020-07-20 RX ADMIN — PANTOPRAZOLE SODIUM 40 MILLIGRAM(S): 20 TABLET, DELAYED RELEASE ORAL at 06:08

## 2020-07-20 RX ADMIN — Medication 100 MILLIGRAM(S): at 22:27

## 2020-07-20 RX ADMIN — Medication 2 MILLIGRAM(S): at 22:27

## 2020-07-20 RX ADMIN — Medication 200 MILLIGRAM(S): at 18:31

## 2020-07-20 RX ADMIN — SODIUM CHLORIDE 100 MILLILITER(S): 9 INJECTION, SOLUTION INTRAVENOUS at 01:00

## 2020-07-20 RX ADMIN — Medication 200 MILLIGRAM(S): at 07:25

## 2020-07-20 RX ADMIN — ENOXAPARIN SODIUM 40 MILLIGRAM(S): 100 INJECTION SUBCUTANEOUS at 11:42

## 2020-07-20 NOTE — PROGRESS NOTE ADULT - SUBJECTIVE AND OBJECTIVE BOX
Patient is a 54y old  Female who presents with a chief complaint of N/V (2020 09:16)    Pt seen and exam. Feeling better. No n/v/d/abd pain/fever/chill/dysuria  small solid bm over night.  no wt loss,fatigue    SUBJECTIVE / OVERNIGHT EVENTS: none  REVIEW OF SYSTEMS: All systems are reviewed and found to be negative    MEDICATIONS  (STANDING):  ciprofloxacin   IVPB 400 milliGRAM(s) IV Intermittent every 12 hours  dextrose 5% + sodium chloride 0.9%. 1000 milliLiter(s) (100 mL/Hr) IV Continuous <Continuous>  enoxaparin Injectable 40 milliGRAM(s) SubCutaneous daily  melatonin 3 milliGRAM(s) Oral at bedtime  melatonin 2 milliGRAM(s) Oral at bedtime  metroNIDAZOLE  IVPB 500 milliGRAM(s) IV Intermittent every 8 hours  pantoprazole    Tablet 40 milliGRAM(s) Oral before breakfast    MEDICATIONS  (PRN):  acetaminophen   Tablet .. 650 milliGRAM(s) Oral every 6 hours PRN Moderate Pain (4 - 6)      CAPILLARY BLOOD GLUCOSE        I&O's Summary      PHYSICAL EXAM:  Vital Signs Last 24 Hrs  T(C): 36.7 (2020 07:54), Max: 37.1 (2020 19:45)  T(F): 98.1 (2020 07:54), Max: 98.8 (2020 19:45)  HR: 43 (2020 07:54) (43 - 55)  BP: 116/73 (2020 07:54) (116/73 - 124/77)  BP(mean): --  RR: 18 (2020 07:54) (18 - 19)  SpO2: 100% (2020 15:15) (100% - 100%)    CONSTITUTIONAL: NAD, well-developed,   EYES: PERRLA; conjunctiva and sclera clear  ENMT: Moist oral mucosa,     RESPIRATORY: Normal respiratory effort; lungs are clear to auscultation bilaterally  CARDIOVASCULAR: Regular rate and rhythm, normal S1 and S2, no murmur/rub/gallop;   EXTS: No lower extremity edema; Peripheral pulses are 2+ bilaterally  ABDOMEN: Nontender to palpation, normoactive bowel sounds, no rebound/guarding;   MUSCLOSKELETAL:   no clubbing or cyanosis of digits; no joint swelling or tenderness to palpation  PSYCH: affect appropriate  NEUROLOGY: A+O to person, place, and time; CN 2-12 are intact and symmetric; no gross sensory deficits;   SKIN: No rashes;     LABS:                        12.1   2.93  )-----------( 144      ( 2020 08:54 )             37.0     07-20    142  |  105  |  4.0<L>  ----------------------------<  121<H>  3.8   |  25.0  |  0.49<L>    Ca    9.4      2020 08:53    TPro  7.0  /  Alb  4.3  /  TBili  0.3<L>  /  DBili  x   /  AST  28  /  ALT  16  /  AlkPhos  53  07-18          Urinalysis Basic - ( 2020 13:21 )    Color: Yellow / Appearance: Clear / S.020 / pH: x  Gluc: x / Ketone: Trace  / Bili: Negative / Urobili: Negative   Blood: x / Protein: 30 mg/dL / Nitrite: Positive   Leuk Esterase: Small / RBC: 0-2 /HPF / WBC 26-50   Sq Epi: x / Non Sq Epi: Moderate / Bacteria: Many        Culture - Stool (collected 2020 08:53)  Source: .Stool Feces  Preliminary Report (2020 09:02):    No enteric pathogens to date: Final culture pending        RADIOLOGY & ADDITIONAL TESTS:  Results Reviewed:   Imaging Personally Reviewed:  Electrocardiogram Personally Reviewed:    COORDINATION OF CARE:  Care Discussed with Consultants/Other Providers [Y/N]:  Prior or Outpatient Records Reviewed [Y/N]:

## 2020-07-20 NOTE — PROGRESS NOTE ADULT - SUBJECTIVE AND OBJECTIVE BOX
Chief Complaint: This is a 54y old woman patient being seen in follow-up consultation for colitis.    HPI / 24H events:  Patient reports no complains, tolerating PO intake, reports soft form stool this am, denies nausea, vomiting, constipation, abdominal pain, chest pain, shortness of breath.    ROS: A 14-point review of systems was reviewed and was otherwise negative save what was reported in the HPI.    PAST MEDICAL/SURGICAL HISTORY:  No pertinent past medical history  No significant past surgical history    MEDICATIONS  (STANDING):  ciprofloxacin   IVPB 400 milliGRAM(s) IV Intermittent every 12 hours  enoxaparin Injectable 40 milliGRAM(s) SubCutaneous daily  melatonin 3 milliGRAM(s) Oral at bedtime  melatonin 2 milliGRAM(s) Oral at bedtime  metroNIDAZOLE  IVPB 500 milliGRAM(s) IV Intermittent every 8 hours  pantoprazole    Tablet 40 milliGRAM(s) Oral before breakfast    MEDICATIONS  (PRN):  acetaminophen   Tablet .. 650 milliGRAM(s) Oral every 6 hours PRN Moderate Pain (4 - 6)    amoxicillin (Hives)    T(C): 36.7 (07-20-20 @ 07:54), Max: 37.1 (07-19-20 @ 19:45)  HR: 43 (07-20-20 @ 07:54) (43 - 55)  BP: 116/73 (07-20-20 @ 07:54) (116/73 - 124/77)  RR: 18 (07-20-20 @ 07:54) (18 - 19)  SpO2: 100% (07-19-20 @ 15:15) (100% - 100%)    I&O's Summary    19 Jul 2020 07:01  -  20 Jul 2020 07:00  --------------------------------------------------------  IN: 100 mL / OUT: 0 mL / NET: 100 mL    20 Jul 2020 07:01  -  20 Jul 2020 13:36  --------------------------------------------------------  IN: 400 mL / OUT: 0 mL / NET: 400 mL      PHYSICAL EXAM:  Constitutional: found sitting comfortably, in no apparent distress  Eyes: Sclerae anicteric, conjunctivae normal  ENMT: Mucus membranes moist, no oropharyngeal thrush noted  Neck: No thyroid nodules appreciated, no significant cervical or supraclavicular lymphadenopathy  Respiratory: Breathing nonlabored; clear to auscultation  Cardiovascular: Regular rate and rhythm  Gastrointestinal: Soft, nontender, nondistended, normoactive bowel sounds; no hepatosplenomegaly appreciated; no rebound tenderness or involuntary guarding  Extremities: No clubbing, cyanosis or edema  Neurological: Alert and oriented to person, place and time.  Skin: No jaundice  Musculoskeletal: No significant peripheral atrophy                     12.1   2.93  )-----------( 144      ( 07-20 @ 08:54 )             37.0                11.5   2.77  )-----------( 127      ( 07-19 @ 06:30 )             35.6                14.7   2.62  )-----------( 144      ( 07-18 @ 13:06 )             44.8       07-20    142  |  105  |  4.0<L>  ----------------------------<  121<H>  3.8   |  25.0  |  0.49<L>    Ca    9.4      20 Jul 2020 08:53          Lipase, Serum: 19 U/L (07-18-20 @ 13:06)        Culture - Stool (collected 19 Jul 2020 08:53)  Source: .Stool Feces  Preliminary Report (20 Jul 2020 09:02):    No enteric pathogens to date: Final culture pending    Culture - Urine (collected 19 Jul 2020 05:02)  Source: .Urine Clean Catch (Midstream)  Preliminary Report (20 Jul 2020 11:02):    >100,000 CFU/ml Gram Negative Rods      IMAGING: I personally reviewed the CT abdomen and pelvis, and I agree with the radiologist's interpretation as described below:    FINDINGS:     Bilateral breast implants are seen    Lung bases are clear.    Liver, gallbladder, spleen, pancreas, adrenal glands, and kidneys are unremarkable. No aortic aneurysm. Mesenteric vasculature is patent. No small bowel obstruction. No adenopathy.    Gynecological structures are present. Small amount of dependent ascites in the pelvis.    There is wall thickening of the colon, predominantly the hepatic flexure through the transverse colon.    The terminal ileum is also thickened.    No free air. No abscess.  Urinary bladder is unremarkable.    Appendix is unremarkable              IMPRESSION:     Wall thickening of the terminal ileum, and colon predominantly involving the hepatic flexure through transverse colon. Differential includes an infectious/inflammatory process(IBD).

## 2020-07-20 NOTE — PROGRESS NOTE ADULT - ASSESSMENT
54 F came with abd pain,n/v/d ct abd suspected colitis      Abdominal pain,nausea,vomiting ,diarrhea,fever sec to colitis could be infective/ inflamatory    -will advance diet.tolerating clear diet  -IVF  -IV Cipro,flagyl  - stool ova,paracyte,cdif  -f/u  GI rec  -Reviewed ct abd/pelvis  -pt does not have any fever now  -blood c/s. monitor temp  -lactic acid nl    Hepatitis C AB positive  check pcr  f/u gi rec    Abnomal UA r/o UTI  - IV CIPRO. F/U urine c/s    Neutropenia with left shift most likely is secondary to infectious colitis.  Per hem/onc -  flow cytometry to be drawn in the AM to exclude leukemia but this is unlikely.  Continue to monitor CBC  - expect improvement with antibiotic therapy    dvt ppx  gi ppx  care of plan dw pt at bedside    pt agreed with above plan. 54 F came with abd pain,n/v/d ct abd suspected colitis      Abdominal pain,nausea,vomiting ,diarrhea,fever sec to colitis could be infective/ inflamatory    -will advance diet.tolerating clear diet  -IVF  -IV Cipro,flagyl  - stool ova,paracyte,cdif  -f/u  GI rec  -Reviewed ct abd/pelvis  -pt does not have any fever now  -blood c/s. monitor temp  -lactic acid nl    chronic Hepatitis C   AB positive  per pt she completed treatment 3 years ago. following out pt GI  REC TO F/U   check pcr  f/u gi rec    Abnomal UA r/o UTI  - IV CIPRO. F/U urine c/s    Neutropenia with left shift most likely is secondary to infectious colitis.  Per hem/onc -  flow cytometry to be drawn in the AM to exclude leukemia but this is unlikely.  Continue to monitor CBC  - expect improvement with antibiotic therapy    dvt ppx  gi ppx  care of plan dw pt at bedside    pt agreed with above plan.

## 2020-07-20 NOTE — PROGRESS NOTE ADULT - ATTENDING COMMENTS
Recurrent UTI's post pelvic sling.  Now with colitis on CT scan and some diarrhea.  Awaiting stool studies.  Improving spontaneously on Cipro / Flagyl.

## 2020-07-20 NOTE — PROGRESS NOTE ADULT - ASSESSMENT
54y old Female with a past medical history significant for urinary bladder stone, sling  presents to the ER with nausea, vomiting and diarrhea for 3 days. Fever.

## 2020-07-20 NOTE — PROGRESS NOTE ADULT - PROBLEM SELECTOR PLAN 1
CT with colitis   Advance diet as tolerated  GI PCR, C diff PCR, stool O &P awaiting results  Please continue Cipro and flagyl orders as per medical team

## 2020-07-21 LAB
-  AMIKACIN: SIGNIFICANT CHANGE UP
-  AMOXICILLIN/CLAVULANIC ACID: SIGNIFICANT CHANGE UP
-  AMPICILLIN/SULBACTAM: SIGNIFICANT CHANGE UP
-  AMPICILLIN: SIGNIFICANT CHANGE UP
-  AZTREONAM: SIGNIFICANT CHANGE UP
-  CEFAZOLIN: SIGNIFICANT CHANGE UP
-  CEFEPIME: SIGNIFICANT CHANGE UP
-  CEFOXITIN: SIGNIFICANT CHANGE UP
-  CEFTRIAXONE: SIGNIFICANT CHANGE UP
-  CIPROFLOXACIN: SIGNIFICANT CHANGE UP
-  ERTAPENEM: SIGNIFICANT CHANGE UP
-  GENTAMICIN: SIGNIFICANT CHANGE UP
-  IMIPENEM: SIGNIFICANT CHANGE UP
-  LEVOFLOXACIN: SIGNIFICANT CHANGE UP
-  MEROPENEM: SIGNIFICANT CHANGE UP
-  NITROFURANTOIN: SIGNIFICANT CHANGE UP
-  PIPERACILLIN/TAZOBACTAM: SIGNIFICANT CHANGE UP
-  TIGECYCLINE: SIGNIFICANT CHANGE UP
-  TOBRAMYCIN: SIGNIFICANT CHANGE UP
-  TRIMETHOPRIM/SULFAMETHOXAZOLE: SIGNIFICANT CHANGE UP
ANION GAP SERPL CALC-SCNC: 14 MMOL/L — SIGNIFICANT CHANGE UP (ref 5–17)
BASOPHILS # BLD AUTO: 0.03 K/UL — SIGNIFICANT CHANGE UP (ref 0–0.2)
BASOPHILS NFR BLD AUTO: 0.6 % — SIGNIFICANT CHANGE UP (ref 0–2)
BUN SERPL-MCNC: 8 MG/DL — SIGNIFICANT CHANGE UP (ref 8–20)
CALCIUM SERPL-MCNC: 10 MG/DL — SIGNIFICANT CHANGE UP (ref 8.6–10.2)
CHLORIDE SERPL-SCNC: 102 MMOL/L — SIGNIFICANT CHANGE UP (ref 98–107)
CO2 SERPL-SCNC: 26 MMOL/L — SIGNIFICANT CHANGE UP (ref 22–29)
CREAT SERPL-MCNC: 0.64 MG/DL — SIGNIFICANT CHANGE UP (ref 0.5–1.3)
CULTURE RESULTS: SIGNIFICANT CHANGE UP
CULTURE RESULTS: SIGNIFICANT CHANGE UP
EOSINOPHIL # BLD AUTO: 0.13 K/UL — SIGNIFICANT CHANGE UP (ref 0–0.5)
EOSINOPHIL NFR BLD AUTO: 2.4 % — SIGNIFICANT CHANGE UP (ref 0–6)
GLUCOSE SERPL-MCNC: 106 MG/DL — HIGH (ref 70–99)
HCT VFR BLD CALC: 42.7 % — SIGNIFICANT CHANGE UP (ref 34.5–45)
HCV RNA SERPL NAA DL=5-ACNC: SIGNIFICANT CHANGE UP IU/ML
HCV RNA SPEC NAA+PROBE-LOG IU: SIGNIFICANT CHANGE UP LOG10IU/ML
HGB BLD-MCNC: 14.2 G/DL — SIGNIFICANT CHANGE UP (ref 11.5–15.5)
IMM GRANULOCYTES NFR BLD AUTO: 1.5 % — SIGNIFICANT CHANGE UP (ref 0–1.5)
LYMPHOCYTES # BLD AUTO: 1.9 K/UL — SIGNIFICANT CHANGE UP (ref 1–3.3)
LYMPHOCYTES # BLD AUTO: 34.9 % — SIGNIFICANT CHANGE UP (ref 13–44)
MCHC RBC-ENTMCNC: 29.1 PG — SIGNIFICANT CHANGE UP (ref 27–34)
MCHC RBC-ENTMCNC: 33.3 GM/DL — SIGNIFICANT CHANGE UP (ref 32–36)
MCV RBC AUTO: 87.5 FL — SIGNIFICANT CHANGE UP (ref 80–100)
METHOD TYPE: SIGNIFICANT CHANGE UP
MONOCYTES # BLD AUTO: 0.65 K/UL — SIGNIFICANT CHANGE UP (ref 0–0.9)
MONOCYTES NFR BLD AUTO: 11.9 % — SIGNIFICANT CHANGE UP (ref 2–14)
NEUTROPHILS # BLD AUTO: 2.66 K/UL — SIGNIFICANT CHANGE UP (ref 1.8–7.4)
NEUTROPHILS NFR BLD AUTO: 48.7 % — SIGNIFICANT CHANGE UP (ref 43–77)
ORGANISM # SPEC MICROSCOPIC CNT: SIGNIFICANT CHANGE UP
ORGANISM # SPEC MICROSCOPIC CNT: SIGNIFICANT CHANGE UP
PLATELET # BLD AUTO: 179 K/UL — SIGNIFICANT CHANGE UP (ref 150–400)
POTASSIUM SERPL-MCNC: 3.9 MMOL/L — SIGNIFICANT CHANGE UP (ref 3.5–5.3)
POTASSIUM SERPL-SCNC: 3.9 MMOL/L — SIGNIFICANT CHANGE UP (ref 3.5–5.3)
RBC # BLD: 4.88 M/UL — SIGNIFICANT CHANGE UP (ref 3.8–5.2)
RBC # FLD: 12.2 % — SIGNIFICANT CHANGE UP (ref 10.3–14.5)
SODIUM SERPL-SCNC: 142 MMOL/L — SIGNIFICANT CHANGE UP (ref 135–145)
SPECIMEN SOURCE: SIGNIFICANT CHANGE UP
SPECIMEN SOURCE: SIGNIFICANT CHANGE UP
TM INTERPRETATION: SIGNIFICANT CHANGE UP
WBC # BLD: 5.45 K/UL — SIGNIFICANT CHANGE UP (ref 3.8–10.5)
WBC # FLD AUTO: 5.45 K/UL — SIGNIFICANT CHANGE UP (ref 3.8–10.5)

## 2020-07-21 PROCEDURE — 99233 SBSQ HOSP IP/OBS HIGH 50: CPT

## 2020-07-21 RX ADMIN — Medication 200 MILLIGRAM(S): at 05:08

## 2020-07-21 RX ADMIN — Medication 3 MILLIGRAM(S): at 21:30

## 2020-07-21 RX ADMIN — Medication 200 MILLIGRAM(S): at 16:01

## 2020-07-21 RX ADMIN — ENOXAPARIN SODIUM 40 MILLIGRAM(S): 100 INJECTION SUBCUTANEOUS at 11:23

## 2020-07-21 RX ADMIN — Medication 100 MILLIGRAM(S): at 11:23

## 2020-07-21 RX ADMIN — Medication 100 MILLIGRAM(S): at 05:07

## 2020-07-21 RX ADMIN — PANTOPRAZOLE SODIUM 40 MILLIGRAM(S): 20 TABLET, DELAYED RELEASE ORAL at 05:09

## 2020-07-21 RX ADMIN — Medication 100 MILLIGRAM(S): at 21:31

## 2020-07-21 RX ADMIN — Medication 2 MILLIGRAM(S): at 21:30

## 2020-07-21 NOTE — PROGRESS NOTE ADULT - ATTENDING COMMENTS
I evaluated this pt. with NP Barrett. She appears to be clinically better and her wbc count yenni to normal levels today. Stool studies collected the results of which are pending. Diet as tolerated. I evaluated this pt. with NP Barrett. She appears to be clinically better and her wbc count yenni to normal levels today. Stool studies collected , C & S negative, other results are pending. Diet to be advanced from full liquids to low fiber, low fat, lactose restricted diet. Repeat labs ordered for the AM. If she tolerates today's diet advancement w/o symptomatic worsening she could be discharged home tomorrow on oral Cipro and Flagyl x 5 more days.

## 2020-07-21 NOTE — PROGRESS NOTE ADULT - PROBLEM SELECTOR PLAN 1
CT revealed Wall thickening of the terminal ileum, and colon predominantly involving the hepatic flexure through transverse colon. Differential includes an infectious/inflammatory process(IBD).  Please continue Flagyl,  PPI daily  Advance diet as tolerated.  Awaiting stool studies

## 2020-07-21 NOTE — PROGRESS NOTE ADULT - ASSESSMENT
54 year old female with a past medical history significant for urinary bladder infection/stones, sp sling, who presented to the ED complaint of nausea vomiting and diarrhea (07/18)  CT revealed Wall thickening of the terminal ileum, and colon predominantly involving the hepatic flexure through transverse colon. Differential includes an infectious/inflammatory process(IBD). Please continue to advanced diet as tolerated, continue antibiotics Cipro and Flagyl, awaiting results of duc ova paracyte, c-diff, monitor WBC's and follow cytology studies

## 2020-07-21 NOTE — PROGRESS NOTE ADULT - SUBJECTIVE AND OBJECTIVE BOX
Chief Complaint: This is a 54y old woman patient being seen in follow-up consultation for colitis.    HPI / 24H events: Patient reports no complains, tolerating PO intake, reports several soft/liquid stool this am, WBC's normalized (5.45). Denies nausea, vomiting, constipation, abdominal pain, chest pain, shortness of breath.      ROS: A 14-point review of systems was reviewed and was otherwise negative save what was reported in the HPI.    PAST MEDICAL/SURGICAL HISTORY:  No pertinent past medical history  No significant past surgical history    MEDICATIONS  (STANDING):  ciprofloxacin   IVPB 400 milliGRAM(s) IV Intermittent every 12 hours  enoxaparin Injectable 40 milliGRAM(s) SubCutaneous daily  melatonin 3 milliGRAM(s) Oral at bedtime  melatonin 2 milliGRAM(s) Oral at bedtime  metroNIDAZOLE  IVPB 500 milliGRAM(s) IV Intermittent every 8 hours  pantoprazole    Tablet 40 milliGRAM(s) Oral before breakfast    MEDICATIONS  (PRN):  acetaminophen   Tablet .. 650 milliGRAM(s) Oral every 6 hours PRN Moderate Pain (4 - 6)    amoxicillin (Hives)    T(C): 36.7 (07-21-20 @ 08:05), Max: 36.8 (07-20-20 @ 16:00)  HR: 63 (07-21-20 @ 08:05) (63 - 72)  BP: 133/84 (07-21-20 @ 08:05) (101/68 - 133/84)  RR: 18 (07-21-20 @ 08:05) (18 - 18)  SpO2: 99% (07-20-20 @ 23:10) (98% - 99%)    I&O's Summary    20 Jul 2020 07:01  -  21 Jul 2020 07:00  --------------------------------------------------------  IN: 700 mL / OUT: 0 mL / NET: 700 mL      PHYSICAL EXAM:  Constitutional: Sitting comfortable, in no apparent distress  Eyes: Sclerae anicteric, conjunctivae normal  ENMT: Mucus membranes moist, no oropharyngeal thrush noted  Respiratory: Breathing nonlabored; clear to auscultation  Cardiovascular: Regular rate and rhythm  Gastrointestinal: Soft, nontender, nondistended, normoactive bowel sounds; no hepatosplenomegaly appreciated; no rebound tenderness or involuntary guarding  Rectal: Perianal exam within normal limits; normal sphincter tone; brown stool on glove  Extremities: No clubbing, cyanosis or edema  Neurological: Alert and oriented to person, place and time; no ast  Skin: No jaundice  Musculoskeletal: No significant peripheral atrophy                   14.2   5.45  )-----------( 179      ( 07-21 @ 09:41 )             42.7                12.1   2.93  )-----------( 144      ( 07-20 @ 08:54 )             37.0                11.5   2.77  )-----------( 127      ( 07-19 @ 06:30 )             35.6                14.7   2.62  )-----------( 144      ( 07-18 @ 13:06 )             44.8       07-20    142  |  105  |  4.0<L>  ----------------------------<  121<H>  3.8   |  25.0  |  0.49<L>    Ca    9.4      20 Jul 2020 08:53      Lipase, Serum: 19 U/L (07-18-20 @ 13:06)        Culture - Stool (collected 19 Jul 2020 08:53)  Source: .Stool Feces  Final Report (21 Jul 2020 07:50):    No enteric pathogens isolated.    (Stool culture examined for Salmonella,    Shigella, Campylobacter, Aeromonas, Plesiomonas,    Vibrio, E.coli O157 and Yersinia)    Culture - Urine (collected 19 Jul 2020 05:02)  Source: .Urine Clean Catch (Midstream)  Preliminary Report (20 Jul 2020 17:11):    >100,000 CFU/ml Escherichia coli    Culture - Blood (collected 18 Jul 2020 18:59)  Source: .Blood Blood  Preliminary Report (20 Jul 2020 19:01):    No growth at 48 hours    Culture - Blood (collected 18 Jul 2020 18:58)  Source: .Blood Blood  Preliminary Report (20 Jul 2020 19:01):    No growth at 48 hours      IMAGING: I personally reviewed the CT of abdomen and pelvis, and I agree with the radiologist's interpretation as described below:                       PROCEDURE DATE:  07/18/2020      FINDINGS:     Bilateral breast implants are seen    Lung bases are clear.    Liver, gallbladder, spleen, pancreas, adrenal glands, and kidneys are unremarkable. No aortic aneurysm. Mesenteric vasculature is patent. No small bowel obstruction. No adenopathy.    Gynecological structures are present. Small amount of dependent ascites in the pelvis.    There is wall thickening of the colon, predominantly the hepatic flexure through the transverse colon.    The terminal ileum is also thickened.    No free air. No abscess.  Urinary bladder is unremarkable.    Appendix is unremarkable  Wall thickening of the terminal ileum, and colon predominantly involving the hepatic flexure through transverse colon. Differential includes an infectious/inflammatory process(IBD).

## 2020-07-21 NOTE — PROGRESS NOTE ADULT - ASSESSMENT
54 F came with abd pain,n/v/d ct abd suspected colitis      Abdominal pain,nausea,vomiting ,diarrhea,fever sec to colitis could be infective/ inflamatory    -will advance diet.  -IVF  -IV Cipro,flagyl  - stool  no ova,paracyte yet ,  -neg cdif  -f/u  GI rec  -Reviewed ct abd/pelvis  -pt does not have any fever now  -blood c/s no growth yet  -lactic acid nl    chronic Hepatitis C   AB positive  per pt she completed treatment 3 years ago. following out pt GI  REC TO F/U   check pcr  f/u gi rec    ECOLI  UTI  - IV CIPRO. F/U urine c/s    Neutropenia with left shift most likely is secondary to infectious colitis.  Per hem/onc -  no flow cytometry for now. check cbc diff today  to exclude leukemia . f/u out pt  Continue to monitor CBC  - wbc improvement with antibiotic therapy    dvt ppx  gi ppx  waiting for urine c/s -pending dc  care of plan mariah pt at bedside  w/partner over phone   pt agreed with above plan.  mariah rn

## 2020-07-21 NOTE — PROGRESS NOTE ADULT - SUBJECTIVE AND OBJECTIVE BOX
Patient is a 54y old  Female who presents with a chief complaint of Colitis (21 Jul 2020 10:07)    pt seen and exam. two small loose bm am.No abd pain,n/v/d/dysuria  tolerating full liquid diet  SUBJECTIVE / OVERNIGHT EVENTS: none  REVIEW OF SYSTEMS: All systems are reviewed and found to be negative except above    MEDICATIONS  (STANDING):  ciprofloxacin   IVPB 400 milliGRAM(s) IV Intermittent every 12 hours  enoxaparin Injectable 40 milliGRAM(s) SubCutaneous daily  melatonin 3 milliGRAM(s) Oral at bedtime  melatonin 2 milliGRAM(s) Oral at bedtime  metroNIDAZOLE  IVPB 500 milliGRAM(s) IV Intermittent every 8 hours  pantoprazole    Tablet 40 milliGRAM(s) Oral before breakfast    MEDICATIONS  (PRN):  acetaminophen   Tablet .. 650 milliGRAM(s) Oral every 6 hours PRN Moderate Pain (4 - 6)      CAPILLARY BLOOD GLUCOSE        I&O's Summary    20 Jul 2020 07:01  -  21 Jul 2020 07:00  --------------------------------------------------------  IN: 700 mL / OUT: 0 mL / NET: 700 mL        PHYSICAL EXAM:  Vital Signs Last 24 Hrs  T(C): 36.7 (21 Jul 2020 08:05), Max: 36.8 (20 Jul 2020 16:00)  T(F): 98 (21 Jul 2020 08:05), Max: 98.2 (20 Jul 2020 16:00)  HR: 63 (21 Jul 2020 08:05) (63 - 72)  BP: 133/84 (21 Jul 2020 08:05) (101/68 - 133/84)  BP(mean): --  RR: 18 (21 Jul 2020 08:05) (18 - 18)  SpO2: 99% (20 Jul 2020 23:10) (98% - 99%)    CONSTITUTIONAL: NAD, well-developed, well-groomed  EYES: PERRLA; conjunctiva and sclera clear  ENMT: Moist oral mucosa,   NECK: Supple, no palpable masses; no thyromegaly  RESPIRATORY: Normal respiratory effort; lungs are clear to auscultation bilaterally  CARDIOVASCULAR: Regular rate and rhythm, normal S1 and S2, no murmur  EXTS: No lower extremity edema; Peripheral pulses are 2+ bilaterally  ABDOMEN: Nontender to palpation, normoactive bowel sounds, no rebound/guarding;   MUSCLOSKELETAL:  Normal gait; no clubbing or cyanosis of digits; no joint swelling or tenderness to palpation  PSYCH: affect appropriate  NEUROLOGY: A+O to person, place, and time; CN 2-12 are intact and symmetric; no gross sensory deficits;   SKIN: No rashes;     LABS:                        14.2   5.45  )-----------( 179      ( 21 Jul 2020 09:41 )             42.7     07-21    142  |  102  |  8.0  ----------------------------<  106<H>  3.9   |  26.0  |  0.64    Ca    10.0      21 Jul 2020 09:41                Culture - Stool (collected 19 Jul 2020 08:53)  Source: .Stool Feces  Final Report (21 Jul 2020 07:50):    No enteric pathogens isolated.    (Stool culture examined for Salmonella,    Shigella, Campylobacter, Aeromonas, Plesiomonas,    Vibrio, E.coli O157 and Yersinia)    Culture - Urine (collected 19 Jul 2020 05:02)  Source: .Urine Clean Catch (Midstream)  Preliminary Report (20 Jul 2020 17:11):    >100,000 CFU/ml Escherichia coli    Culture - Blood (collected 18 Jul 2020 18:59)  Source: .Blood Blood  Preliminary Report (20 Jul 2020 19:01):    No growth at 48 hours    Culture - Blood (collected 18 Jul 2020 18:58)  Source: .Blood Blood  Preliminary Report (20 Jul 2020 19:01):    No growth at 48 hours        RADIOLOGY & ADDITIONAL TESTS:  Results Reviewed:   Imaging Personally Reviewed:  Electrocardiogram Personally Reviewed:    COORDINATION OF CARE:  Care Discussed with Consultants/Other Providers [Y/N]:  Prior or Outpatient Records Reviewed [Y/N]:

## 2020-07-22 ENCOUNTER — TRANSCRIPTION ENCOUNTER (OUTPATIENT)
Age: 55
End: 2020-07-22

## 2020-07-22 VITALS
RESPIRATION RATE: 18 BRPM | SYSTOLIC BLOOD PRESSURE: 118 MMHG | OXYGEN SATURATION: 95 % | HEART RATE: 80 BPM | TEMPERATURE: 98 F | DIASTOLIC BLOOD PRESSURE: 84 MMHG

## 2020-07-22 LAB
ANION GAP SERPL CALC-SCNC: 14 MMOL/L — SIGNIFICANT CHANGE UP (ref 5–17)
BASOPHILS # BLD AUTO: 0.04 K/UL — SIGNIFICANT CHANGE UP (ref 0–0.2)
BASOPHILS NFR BLD AUTO: 0.9 % — SIGNIFICANT CHANGE UP (ref 0–2)
BUN SERPL-MCNC: 10 MG/DL — SIGNIFICANT CHANGE UP (ref 8–20)
CALCIUM SERPL-MCNC: 9.8 MG/DL — SIGNIFICANT CHANGE UP (ref 8.6–10.2)
CHLORIDE SERPL-SCNC: 99 MMOL/L — SIGNIFICANT CHANGE UP (ref 98–107)
CO2 SERPL-SCNC: 27 MMOL/L — SIGNIFICANT CHANGE UP (ref 22–29)
CREAT SERPL-MCNC: 0.67 MG/DL — SIGNIFICANT CHANGE UP (ref 0.5–1.3)
CULTURE RESULTS: SIGNIFICANT CHANGE UP
EOSINOPHIL # BLD AUTO: 0.13 K/UL — SIGNIFICANT CHANGE UP (ref 0–0.5)
EOSINOPHIL NFR BLD AUTO: 2.6 % — SIGNIFICANT CHANGE UP (ref 0–6)
GIANT PLATELETS BLD QL SMEAR: PRESENT — SIGNIFICANT CHANGE UP
GLUCOSE SERPL-MCNC: 125 MG/DL — HIGH (ref 70–99)
HCT VFR BLD CALC: 42.2 % — SIGNIFICANT CHANGE UP (ref 34.5–45)
HGB BLD-MCNC: 13.8 G/DL — SIGNIFICANT CHANGE UP (ref 11.5–15.5)
LYMPHOCYTES # BLD AUTO: 0.98 K/UL — LOW (ref 1–3.3)
LYMPHOCYTES # BLD AUTO: 20 % — SIGNIFICANT CHANGE UP (ref 13–44)
MANUAL SMEAR VERIFICATION: SIGNIFICANT CHANGE UP
MCHC RBC-ENTMCNC: 29.1 PG — SIGNIFICANT CHANGE UP (ref 27–34)
MCHC RBC-ENTMCNC: 32.7 GM/DL — SIGNIFICANT CHANGE UP (ref 32–36)
MCV RBC AUTO: 88.8 FL — SIGNIFICANT CHANGE UP (ref 80–100)
METAMYELOCYTES # FLD: 3.5 % — HIGH (ref 0–0)
MONOCYTES # BLD AUTO: 0.3 K/UL — SIGNIFICANT CHANGE UP (ref 0–0.9)
MONOCYTES NFR BLD AUTO: 6.1 % — SIGNIFICANT CHANGE UP (ref 2–14)
MYELOCYTES NFR BLD: 0.9 % — HIGH (ref 0–0)
NEUTROPHILS # BLD AUTO: 2.76 K/UL — SIGNIFICANT CHANGE UP (ref 1.8–7.4)
NEUTROPHILS NFR BLD AUTO: 56.5 % — SIGNIFICANT CHANGE UP (ref 43–77)
PLAT MORPH BLD: NORMAL — SIGNIFICANT CHANGE UP
PLATELET # BLD AUTO: 184 K/UL — SIGNIFICANT CHANGE UP (ref 150–400)
POTASSIUM SERPL-MCNC: 4.3 MMOL/L — SIGNIFICANT CHANGE UP (ref 3.5–5.3)
POTASSIUM SERPL-SCNC: 4.3 MMOL/L — SIGNIFICANT CHANGE UP (ref 3.5–5.3)
RBC # BLD: 4.75 M/UL — SIGNIFICANT CHANGE UP (ref 3.8–5.2)
RBC # FLD: 12.4 % — SIGNIFICANT CHANGE UP (ref 10.3–14.5)
RBC BLD AUTO: NORMAL — SIGNIFICANT CHANGE UP
SODIUM SERPL-SCNC: 140 MMOL/L — SIGNIFICANT CHANGE UP (ref 135–145)
SPECIMEN SOURCE: SIGNIFICANT CHANGE UP
VARIANT LYMPHS # BLD: 9.5 % — HIGH (ref 0–6)
WBC # BLD: 4.88 K/UL — SIGNIFICANT CHANGE UP (ref 3.8–10.5)
WBC # FLD AUTO: 4.88 K/UL — SIGNIFICANT CHANGE UP (ref 3.8–10.5)

## 2020-07-22 PROCEDURE — 96375 TX/PRO/DX INJ NEW DRUG ADDON: CPT

## 2020-07-22 PROCEDURE — 36415 COLL VENOUS BLD VENIPUNCTURE: CPT

## 2020-07-22 PROCEDURE — 88185 FLOWCYTOMETRY/TC ADD-ON: CPT

## 2020-07-22 PROCEDURE — 96366 THER/PROPH/DIAG IV INF ADDON: CPT

## 2020-07-22 PROCEDURE — 80048 BASIC METABOLIC PNL TOTAL CA: CPT

## 2020-07-22 PROCEDURE — 87046 STOOL CULTR AEROBIC BACT EA: CPT

## 2020-07-22 PROCEDURE — 86803 HEPATITIS C AB TEST: CPT

## 2020-07-22 PROCEDURE — 86769 SARS-COV-2 COVID-19 ANTIBODY: CPT

## 2020-07-22 PROCEDURE — 96365 THER/PROPH/DIAG IV INF INIT: CPT | Mod: XU

## 2020-07-22 PROCEDURE — 84702 CHORIONIC GONADOTROPIN TEST: CPT

## 2020-07-22 PROCEDURE — 85027 COMPLETE CBC AUTOMATED: CPT

## 2020-07-22 PROCEDURE — U0003: CPT

## 2020-07-22 PROCEDURE — 83605 ASSAY OF LACTIC ACID: CPT

## 2020-07-22 PROCEDURE — 87040 BLOOD CULTURE FOR BACTERIA: CPT

## 2020-07-22 PROCEDURE — 87522 HEPATITIS C REVRS TRNSCRPJ: CPT

## 2020-07-22 PROCEDURE — 87186 SC STD MICRODIL/AGAR DIL: CPT

## 2020-07-22 PROCEDURE — 88184 FLOWCYTOMETRY/ TC 1 MARKER: CPT

## 2020-07-22 PROCEDURE — 74177 CT ABD & PELVIS W/CONTRAST: CPT

## 2020-07-22 PROCEDURE — 87086 URINE CULTURE/COLONY COUNT: CPT

## 2020-07-22 PROCEDURE — 83690 ASSAY OF LIPASE: CPT

## 2020-07-22 PROCEDURE — 87177 OVA AND PARASITES SMEARS: CPT

## 2020-07-22 PROCEDURE — 80053 COMPREHEN METABOLIC PANEL: CPT

## 2020-07-22 PROCEDURE — 81001 URINALYSIS AUTO W/SCOPE: CPT

## 2020-07-22 PROCEDURE — 87205 SMEAR GRAM STAIN: CPT

## 2020-07-22 PROCEDURE — 99239 HOSP IP/OBS DSCHRG MGMT >30: CPT

## 2020-07-22 PROCEDURE — 99232 SBSQ HOSP IP/OBS MODERATE 35: CPT

## 2020-07-22 PROCEDURE — 87045 FECES CULTURE AEROBIC BACT: CPT

## 2020-07-22 PROCEDURE — 96361 HYDRATE IV INFUSION ADD-ON: CPT

## 2020-07-22 PROCEDURE — 87521 HEPATITIS C PROBE&RVRS TRNSC: CPT

## 2020-07-22 PROCEDURE — 99285 EMERGENCY DEPT VISIT HI MDM: CPT | Mod: 25

## 2020-07-22 RX ORDER — METRONIDAZOLE 500 MG
1 TABLET ORAL
Qty: 21 | Refills: 0
Start: 2020-07-22 | End: 2020-07-28

## 2020-07-22 RX ORDER — MOXIFLOXACIN HYDROCHLORIDE TABLETS, 400 MG 400 MG/1
1 TABLET, FILM COATED ORAL
Qty: 14 | Refills: 0
Start: 2020-07-22 | End: 2020-07-28

## 2020-07-22 RX ORDER — PANTOPRAZOLE SODIUM 20 MG/1
1 TABLET, DELAYED RELEASE ORAL
Qty: 30 | Refills: 0
Start: 2020-07-22 | End: 2020-08-20

## 2020-07-22 RX ADMIN — PANTOPRAZOLE SODIUM 40 MILLIGRAM(S): 20 TABLET, DELAYED RELEASE ORAL at 05:22

## 2020-07-22 RX ADMIN — Medication 100 MILLIGRAM(S): at 05:21

## 2020-07-22 RX ADMIN — Medication 200 MILLIGRAM(S): at 05:21

## 2020-07-22 NOTE — DISCHARGE NOTE PROVIDER - PROVIDER TOKENS
PROVIDER:[TOKEN:[[id number]],FOLLOWUP:[1 week]],PROVIDER:[TOKEN:[[id number]],FOLLOWUP:[1 week]]

## 2020-07-22 NOTE — PROGRESS NOTE ADULT - ASSESSMENT
This is a 54y old woman patient being seen in follow-up consultation for colitis. Appears to be clinically better and her wbc count normalized . At this point patient could be discharge home on oral Cipro and Flagyl.

## 2020-07-22 NOTE — DISCHARGE NOTE PROVIDER - CARE PROVIDERS DIRECT ADDRESSES
[email or web address]

## 2020-07-22 NOTE — DISCHARGE NOTE NURSING/CASE MANAGEMENT/SOCIAL WORK - PATIENT PORTAL LINK FT
You can access the FollowMyHealth Patient Portal offered by [facility] by registering at the following website: [email or web address]. By joining [facility]’s FollowMyHealth portal, you will also be able to view your health information using other applications (apps) compatible with our system.

## 2020-07-22 NOTE — PROGRESS NOTE ADULT - ATTENDING COMMENTS
The patient essentially presented with an acute colitis, probably self limited and infectious despite negative stool studies. Also with UTI. Would d/c on cipro and flagyl. F/U with Dr Vides as outpatient.

## 2020-07-22 NOTE — PROGRESS NOTE ADULT - SUBJECTIVE AND OBJECTIVE BOX
Chief Complaint: This is a 54y old woman patient being seen in follow-up consultation for colitis.    HPI / 24H events: Patient reports no complains, tolerating PO intake, reports one soft/liquid stool this am, WBC's normalized. Denies nausea, vomiting, constipation, abdominal pain, chest pain, shortness of breath.    ROS: A 14-point review of systems was reviewed and was otherwise negative save what was reported in the HPI.    PAST MEDICAL/SURGICAL HISTORY:  No pertinent past medical history  No significant past surgical history    MEDICATIONS  (STANDING):  ciprofloxacin   IVPB 400 milliGRAM(s) IV Intermittent every 12 hours  enoxaparin Injectable 40 milliGRAM(s) SubCutaneous daily  melatonin 3 milliGRAM(s) Oral at bedtime  melatonin 2 milliGRAM(s) Oral at bedtime  metroNIDAZOLE  IVPB 500 milliGRAM(s) IV Intermittent every 8 hours  pantoprazole    Tablet 40 milliGRAM(s) Oral before breakfast    MEDICATIONS  (PRN):  acetaminophen   Tablet .. 650 milliGRAM(s) Oral every 6 hours PRN Moderate Pain (4 - 6)    amoxicillin (Hives)    T(C): 36.4 (07-22-20 @ 07:24), Max: 36.4 (07-21-20 @ 23:11)  HR: 56 (07-22-20 @ 07:24) (56 - 65)  BP: 130/80 (07-22-20 @ 07:24) (99/64 - 130/80)  RR: 18 (07-22-20 @ 07:24) (18 - 18)  SpO2: 97% (07-21-20 @ 23:11) (97% - 97%)    I&O's Summary    PHYSICAL EXAM:  Constitutional: Well-developed, well-nourished, in no apparent distress  Eyes: Sclerae anicteric, conjunctivae normal  ENMT: Mucus membranes moist, no oropharyngeal thrush noted  Neck: No thyroid nodules appreciated, no significant cervical or supraclavicular lymphadenopathy  Respiratory: Breathing nonlabored; clear to auscultation  Cardiovascular: Regular rate and rhythm  Gastrointestinal: Soft, nontender, nondistended, normoactive bowel sounds; no hepatosplenomegaly appreciated; no rebound tenderness or involuntary guarding  Rectal: Perianal exam within normal limits; normal sphincter tone; brown stool on glove  Extremities: No clubbing, cyanosis or edema  Neurological: Alert and oriented to person, place and time; no asterixis  Skin: No jaundice  Lymph Nodes: No significant lymphadenopathy  Musculoskeletal: No significant peripheral atrophy  Psychiatric: Affect and mood appropriate                   13.8   4.88  )-----------( 184      ( 07-22 @ 07:44 )             42.2                14.2   5.45  )-----------( 179      ( 07-21 @ 09:41 )             42.7                12.1   2.93  )-----------( 144      ( 07-20 @ 08:54 )             37.0       07-22    140  |  99  |  10.0  ----------------------------<  125<H>  4.3   |  27.0  |  0.67    Ca    9.8      22 Jul 2020 07:44          Lipase, Serum: 19 U/L (07-18-20 @ 13:06)    IMAGING: I personally reviewed the CT of abdomen and pelvis, and I agree with the radiologist's interpretation as described below:  FINDINGS:     Bilateral breast implants are seen    Lung bases are clear.    Liver, gallbladder, spleen, pancreas, adrenal glands, and kidneys are unremarkable. No aortic aneurysm. Mesenteric vasculature is patent. No small bowel obstruction. No adenopathy.    Gynecological structures are present. Small amount of dependent ascites in the pelvis.    There is wall thickening of the colon, predominantly the hepatic flexure through the transverse colon.    The terminal ileum is also thickened.    No free air. No abscess.  Urinary bladder is unremarkable.    Appendix is unremarkable        IMPRESSION:     Wall thickening of the terminal ileum, and colon predominantly involving the hepatic flexure through transverse colon. Differential includes an infectious/inflammatory process(IBD).

## 2020-07-22 NOTE — DISCHARGE NOTE PROVIDER - HOSPITAL COURSE
54 F came with abd pain,n/v/d ct abd suspected colitis- c/o abdominal pain,nausea,vomiting ,diarrhea,fever sec to colitis could be infective/ inflammatory. CT abd showed showed inflammatory process.  Pt made NPO and started on IVF and  IV Cipro/Flagyl.  C.diff- neg.  Stool neg for ova/parasite.  Afebrile.  Diet advanced as tolerated to Low fiber diet. Found to have E.col UTI on IV cipro.  Found to have neutropenia with left shift most likely is secondary to infectious colitis. Repeat CBC improved.  Per hem/onc -  no flow cytometry for now. check cbc diff today  to exclude leukemia . f/u out pt. Pt to be discharged with PO antbx and follow-up with GI.        Vital Signs Last 24 Hrs    T(C): 36.4 (22 Jul 2020 07:24), Max: 36.4 (21 Jul 2020 23:11)    T(F): 97.5 (22 Jul 2020 07:24), Max: 97.6 (21 Jul 2020 23:11)    HR: 56 (22 Jul 2020 07:24) (56 - 65)    BP: 130/80 (22 Jul 2020 07:24) (99/64 - 130/80)    BP(mean): --    RR: 18 (22 Jul 2020 07:24) (18 - 18)    SpO2: 97% (21 Jul 2020 23:11) (97% - 97%)                                13.8     4.88  )-----------( 184      ( 22 Jul 2020 07:44 )               42.2         07-22        140  |  99  |  10.0    ----------------------------<  125<H>    4.3   |  27.0  |  0.67        Ca    9.8      22 Jul 2020 07:44            < from: CT Abdomen and Pelvis w/ Oral Cont and w/ IV Cont (07.18.20 @ 16:14) >        Wall thickening of the terminal ileum, and colon predominantly involving the hepatic flexure through transverse colon. Differential includes an infectious/inflammatory process(IBD).        < end of copied text >        Culture - Urine (07.19.20 @ 05:02)      -  Amikacin: S <=16      -  Amoxicillin/Clavulanic Acid: S <=8/4      -  Ampicillin: S <=8 These ampicillin results predict results for amoxicillin      -  Ampicillin/Sulbactam: S <=4/2 Enterobacter, Citrobacter, and Serratia may develop resistance during prolonged therapy (3-4 days)      -  Aztreonam: S <=4      -  Cefazolin: S <=2 (MIC_CL_COM_ENTERIC_CEFAZU) For uncomplicated UTI with K. pneumoniae, E. coli, or P. mirablis: ORVILLE <=16 is sensitive and ORVIL[place] >=32 is resistant. This also predicts results for oral agents cefaclor, cefdinir, cefpodoxime, cefprozil, cefuroxime axetil, cephalexin and locarbef for uncomplicated UTI. Note that some isolates may be susceptible to these agents while testing resistant to cefazolin.      -  Cefepime: S <=2      -  Cefoxitin: S <=8      -  Ceftriaxone: S <=1 Enterobacter, Citrobacter, and Serratia may develop resistance during prolonged therapy      -  Ciprofloxacin: S <=0.25      -  Ertapenem: S <=0.5      -  Gentamicin: S <=2      -  Imipenem: S <=1      -  Levofloxacin: S <=0.5      -  Meropenem: S <=1      -  Nitrofurantoin: S <=32 Should not be used to treat pyelonephritis      -  Piperacillin/Tazobactam: S <=8      -  Tigecycline: S <=2      -  Tobramycin: S <=2      -  Trimethoprim/Sulfamethoxazole: S <=0.5/9.5      Specimen Source: .Urine Clean Catch (Midstream)      Culture Results:     >100,000 CFU/ml Escherichia coli      Organism Identification: Escherichia coli      Organism: Escherichia coli      Method Type: [place] 54 F came with abd pain,n/v/d ct abd suspected colitis- c/o abdominal pain,nausea,vomiting ,diarrhea,fever sec to colitis could be infective/ inflammatory. CT abd showed showed inflammatory process.  Pt made NPO and started on IVF and  IV Cipro/Flagyl.  C.diff- neg.  Stool neg for ova/parasite.  Afebrile.  Diet advanced as tolerated to Low fiber diet. Found to have E.col UTI on IV cipro.  Found to have neutropenia with left shift most likely is secondary to infectious colitis. Repeat CBC improved.  Per hem/onc -  no flow cytometry for now . f/u out pt. Pt to be discharged with PO antbx and follow-up with GI.        Vital Signs Last 24 Hrs    T(C): 36.4 (22 Jul 2020 07:24), Max: 36.4 (21 Jul 2020 23:11)    T(F): 97.5 (22 Jul 2020 07:24), Max: 97.6 (21 Jul 2020 23:11)    HR: 56 (22 Jul 2020 07:24) (56 - 65)    BP: 130/80 (22 Jul 2020 07:24) (99/64 - 130/80)    BP(mean): --    RR: 18 (22 Jul 2020 07:24) (18 - 18)    SpO2: 97% (21 Jul 2020 23:11) (97% - 97%)                                13.8     4.88  )-----------( 184      ( 22 Jul 2020 07:44 )               42.2         07-22        140  |  99  |  10.0    ----------------------------<  125<H>    4.3   |  27.0  |  0.67        Ca    9.8      22 Jul 2020 07:44            < from: CT Abdomen and Pelvis w/ Oral Cont and w/ IV Cont (07.18.20 @ 16:14) >        Wall thickening of the terminal ileum, and colon predominantly involving the hepatic flexure through transverse colon. Differential includes an infectious/inflammatory process(IBD).        < end of copied text >        Culture - Urine (07.19.20 @ 05:02)      -  Amikacin: S <=16      -  Amoxicillin/Clavulanic Acid: S <=8/4      -  Ampicillin: S <=8 These ampicillin results predict results for amoxicillin      -  Ampicillin/Sulbactam: S <=4/2 Enterobacter, Citrobacter, and Serratia may develop resistance during prolonged therapy (3-4 days)      -  Aztreonam: S <=4      -  Cefazolin: S <=2 (MIC_CL_COM_ENTERIC_CEFAZU) For uncomplicated UTI with K. pneumoniae, E. coli, or P. mirablis: ORV[place] <=16 is sensitive and ORV[place] >=32 is resistant. This also predicts results for oral agents cefaclor, cefdinir, cefpodoxime, cefprozil, cefuroxime axetil, cephalexin and locarbef for uncomplicated UTI. Note that some isolates may be susceptible to these agents while testing resistant to cefazolin.      -  Cefepime: S <=2      -  Cefoxitin: S <=8      -  Ceftriaxone: S <=1 Enterobacter, Citrobacter, and Serratia may develop resistance during prolonged therapy      -  Ciprofloxacin: S <=0.25      -  Ertapenem: S <=0.5      -  Gentamicin: S <=2      -  Imipenem: S <=1      -  Levofloxacin: S <=0.5      -  Meropenem: S <=1      -  Nitrofurantoin: S <=32 Should not be used to treat pyelonephritis      -  Piperacillin/Tazobactam: S <=8      -  Tigecycline: S <=2      -  Tobramycin: S <=2      -  Trimethoprim/Sulfamethoxazole: S <=0.5/9.5      Specimen Source: .Urine Clean Catch (Midstream)      Culture Results:     >100,000 CFU/ml Escherichia coli      Organism Identification: Escherichia coli      Organism: Escherichia coli      Method Type: ORVILEL                Addendum by :    Pt seen and exam. No acute issues. + form stool. No diarrgea.    will f/u GI, spoke with Dr Medina [facility]  rec f/u out patient. Blast cell likely from infection.            T(C): 36.4 (07-22-20 @ 07:24), Max: 36.4 (07-21-20 @ 23:11)    HR: 56 (07-22-20 @ 07:24) (56 - 65)    BP: 130/80 (07-22-20 @ 07:24) (99/64 - 130/80)    RR: 18 (07-22-20 @ 07:24) (18 - 18)    SpO2: 97% (07-21-20 @ 23:11) (97% - 97%)        GEN - NAD    HEENT - NCAT, EOMI, TERI, no JVD/bruit.    RESP - CTA BL, no wheeze/stridor/rhonchi/crackles. not on supplemental O2.    CARDIO - NS1S2, RRR. No murmurs/rubs/gallops.    ABD - Soft/Non tender/Non distended. Normal BS x4 quadrants.     Ext - No SHARON. no signs of venous/arterial stasis ulcers    MSK - full ROM of BL upper and lower extremities without pain or restriction. BL 5/5 strength on upper and lower extremities.     Neuro - cn 2-12 grossly intact. cerebellar function intact. no visible seizure activity appreciated. no tremor. gait not observed.     Skin - clean, dry, intact.      Psych- AAOx3. no suicidal/homicidal ideation. appropriate behaviour. attentive. normal affect.        Disposition home    Time spent>35 minutes for discharge

## 2020-07-22 NOTE — DISCHARGE NOTE PROVIDER - NSDCMRMEDTOKEN_GEN_ALL_CORE_FT
Cipro 500 mg oral tablet: 1 tab(s) orally every 12 hours   Flagyl 500 mg oral tablet: 1 tab(s) orally 3 times a day    mg oral tablet: 1 tab(s) orally every 6 hours  pantoprazole 40 mg oral delayed release tablet: 1 tab(s) orally once a day (before a meal) Cipro 500 mg oral tablet: 1 tab(s) orally every 12 hours   Flagyl 500 mg oral tablet: 1 tab(s) orally 3 times a day   pantoprazole 40 mg oral delayed release tablet: 1 tab(s) orally once a day (before a meal)

## 2020-07-22 NOTE — DISCHARGE NOTE PROVIDER - NSDCCPCAREPLAN_GEN_ALL_CORE_FT
PRINCIPAL DISCHARGE DIAGNOSIS  Diagnosis: Colitis  Assessment and Plan of Treatment: treated with antbx, clinically improved      SECONDARY DISCHARGE DIAGNOSES  Diagnosis: UTI (urinary tract infection)  Assessment and Plan of Treatment: treated with antbx PRINCIPAL DISCHARGE DIAGNOSIS  Diagnosis: Colitis  Assessment and Plan of Treatment: treated with antbx, clinically improved      SECONDARY DISCHARGE DIAGNOSES  Diagnosis: Nausea & vomiting  Assessment and Plan of Treatment:     Diagnosis: Abdominal pain  Assessment and Plan of Treatment:     Diagnosis: Neutropenia  Assessment and Plan of Treatment:     Diagnosis: UTI (urinary tract infection)  Assessment and Plan of Treatment: treated with antbx

## 2020-07-23 LAB
CULTURE RESULTS: SIGNIFICANT CHANGE UP
CULTURE RESULTS: SIGNIFICANT CHANGE UP
HCV RNA FLD QL NAA+PROBE: SIGNIFICANT CHANGE UP
SPECIMEN SOURCE: SIGNIFICANT CHANGE UP
SPECIMEN SOURCE: SIGNIFICANT CHANGE UP

## 2020-07-24 PROBLEM — Z00.00 ENCOUNTER FOR PREVENTIVE HEALTH EXAMINATION: Status: ACTIVE | Noted: 2020-07-24

## 2020-08-04 ENCOUNTER — APPOINTMENT (OUTPATIENT)
Dept: GASTROENTEROLOGY | Facility: CLINIC | Age: 55
End: 2020-08-04
Payer: MEDICAID

## 2020-08-04 VITALS
BODY MASS INDEX: 21.97 KG/M2 | OXYGEN SATURATION: 98 % | DIASTOLIC BLOOD PRESSURE: 80 MMHG | HEART RATE: 76 BPM | SYSTOLIC BLOOD PRESSURE: 110 MMHG | HEIGHT: 67 IN | RESPIRATION RATE: 14 BRPM | WEIGHT: 140 LBS | TEMPERATURE: 97.6 F

## 2020-08-04 DIAGNOSIS — Z78.9 OTHER SPECIFIED HEALTH STATUS: ICD-10-CM

## 2020-08-04 DIAGNOSIS — Z09 ENCOUNTER FOR FOLLOW-UP EXAMINATION AFTER COMPLETED TREATMENT FOR CONDITIONS OTHER THAN MALIGNANT NEOPLASM: ICD-10-CM

## 2020-08-04 DIAGNOSIS — Z87.440 PERSONAL HISTORY OF URINARY (TRACT) INFECTIONS: ICD-10-CM

## 2020-08-04 PROCEDURE — 99214 OFFICE O/P EST MOD 30 MIN: CPT

## 2020-08-04 NOTE — ASSESSMENT
[FreeTextEntry1] : Impression: Abnormal CT scan of the colon suggesting colitis which appears to be improving rule out infectious versus inflammatory etiology.\par \par Recommendations: Colonoscopy was advised for further evaluation of the above to rule out inflammatory etiology of her colitis is her stool studies in the hospital were all negative. The risks versus intravenous sedation, and alternative testing such as virtual colonoscopy, were individually explained to the patient today who appeared to understand all of the above and was agreeable to proceeding with colonoscopy. Her ASA classification is 2. She will be prepped with MiraLax and Dulcolax tablets and is medically optimized for the proposed colonoscopy and appeared to understand all of the above instructions, information, and management plan.

## 2020-08-04 NOTE — PHYSICAL EXAM
[General Appearance - Alert] : alert [General Appearance - In No Acute Distress] : in no acute distress [General Appearance - Well Nourished] : well nourished [General Appearance - Well Developed] : well developed [General Appearance - Well-Appearing] : healthy appearing [PERRL With Normal Accommodation] : pupils were equal in size, round, and reactive to light [Extraocular Movements] : extraocular movements were intact [Sclera] : the sclera and conjunctiva were normal [Examination Of The Oral Cavity] : the lips and gums were normal [Outer Ear] : the ears and nose were normal in appearance [Neck Appearance] : the appearance of the neck was normal [Oropharynx] : the oropharynx was normal [Neck Cervical Mass (___cm)] : no neck mass was observed [Thyroid Nodule] : there were no palpable thyroid nodules [Thyroid Diffuse Enlargement] : the thyroid was not enlarged [Jugular Venous Distention Increased] : there was no jugular-venous distention [Auscultation Breath Sounds / Voice Sounds] : lungs were clear to auscultation bilaterally [Heart Rate And Rhythm] : heart rate was normal and rhythm regular [Heart Sounds Gallop] : no gallops [Heart Sounds] : normal S1 and S2 [Heart Sounds Pericardial Friction Rub] : no pericardial rub [Murmurs] : no murmurs [Bowel Sounds] : normal bowel sounds [Abdomen Soft] : soft [Abdomen Mass (___ Cm)] : no abdominal mass palpated [] : no hepato-splenomegaly [Abdomen Tenderness] : non-tender [No CVA Tenderness] : no ~M costovertebral angle tenderness [Abnormal Walk] : normal gait [Musculoskeletal - Swelling] : no joint swelling seen [Skin Turgor] : normal skin turgor [Skin Color & Pigmentation] : normal skin color and pigmentation [Oriented To Time, Place, And Person] : oriented to person, place, and time [FreeTextEntry1] : done in the hospital

## 2020-08-04 NOTE — HISTORY OF PRESENT ILLNESS
[None] : had no significant interval events [Heartburn] : denies heartburn [Vomiting] : denies vomiting [Nausea] : denies nausea [Diarrhea] : resolved diarrhea [Constipation] : denies constipation [Yellow Skin Or Eyes (Jaundice)] : denies jaundice [Rectal Pain] : denies rectal pain [Abdominal Swelling] : denies abdominal swelling [Abdominal Pain] : resolved abdominal pain [Cholelithiasis] : cholelithiasis [Cholecystectomy] : cholecystectomy [Abdominal Surgery] : abdominal surgery [Wt Gain ___ Lbs] : no recent weight gain [Wt Loss ___ Lbs] : no recent weight loss [Hiatus Hernia] : no hiatus hernia [GERD] : no gastroesophageal reflux disease [Pancreatitis] : no pancreatitis [Peptic Ulcer Disease] : no peptic ulcer disease [Inflammatory Bowel Disease] : no inflammatory bowel disease [Kidney Stone] : no kidney stone [Irritable Bowel Syndrome] : no irritable bowel syndrome [Diverticulitis] : no diverticulitis [Malignancy] : no malignancy [Alcohol Abuse] : no alcohol abuse [de-identified] : roughly 3 weeks ago [Appendectomy] : no appendectomy [de-identified] : Patient presents for followup visit status post recent [facility] hospitalization for abdominal pain and diarrhea with a CT scan that showed colitis involving the hepatic flexure through the transverse colon with negative stool studies. Patient states that she had a negative colonoscopy done in 2015 for CRC screening and has had none since. She was discharged on oral Cipro and Flagyl which is presently done with and states that her diarrhea has resolved. [de-identified] : GI consultation at [facility]

## 2020-09-24 DIAGNOSIS — Z01.818 ENCOUNTER FOR OTHER PREPROCEDURAL EXAMINATION: ICD-10-CM

## 2020-09-25 ENCOUNTER — LABORATORY RESULT (OUTPATIENT)
Age: 55
End: 2020-09-25

## 2020-09-25 ENCOUNTER — APPOINTMENT (OUTPATIENT)
Dept: DISASTER EMERGENCY | Facility: CLINIC | Age: 55
End: 2020-09-25

## 2020-09-29 ENCOUNTER — APPOINTMENT (OUTPATIENT)
Dept: GASTROENTEROLOGY | Facility: GI CENTER | Age: 55
End: 2020-09-29
Payer: MEDICAID

## 2020-09-29 ENCOUNTER — OUTPATIENT (OUTPATIENT)
Dept: OUTPATIENT SERVICES | Facility: HOSPITAL | Age: 55
LOS: 1 days | End: 2020-09-29
Payer: COMMERCIAL

## 2020-09-29 DIAGNOSIS — R93.3 ABNORMAL FINDINGS ON DIAGNOSTIC IMAGING OF OTHER PARTS OF DIGESTIVE TRACT: ICD-10-CM

## 2020-09-29 DIAGNOSIS — K52.9 NONINFECTIVE GASTROENTERITIS AND COLITIS, UNSPECIFIED: ICD-10-CM

## 2020-09-29 DIAGNOSIS — K64.8 OTHER HEMORRHOIDS: ICD-10-CM

## 2020-09-29 PROCEDURE — 45378 DIAGNOSTIC COLONOSCOPY: CPT

## 2020-09-29 RX ORDER — NITROFURANTOIN (MONOHYDRATE/MACROCRYSTALS) 25; 75 MG/1; MG/1
100 CAPSULE ORAL
Qty: 14 | Refills: 0 | Status: DISCONTINUED | COMMUNITY
Start: 2020-04-30 | End: 2020-09-29

## 2020-09-29 RX ORDER — TOPIRAMATE 25 MG/1
25 TABLET, FILM COATED ORAL
Qty: 60 | Refills: 0 | Status: DISCONTINUED | COMMUNITY
Start: 2020-08-10 | End: 2020-09-29

## 2020-09-29 RX ORDER — METRONIDAZOLE 500 MG/1
500 TABLET ORAL
Qty: 21 | Refills: 0 | Status: DISCONTINUED | COMMUNITY
Start: 2020-07-22 | End: 2020-09-29

## 2020-09-29 RX ORDER — ESCITALOPRAM OXALATE 10 MG/1
10 TABLET ORAL
Qty: 30 | Refills: 0 | Status: DISCONTINUED | COMMUNITY
Start: 2020-08-10 | End: 2020-09-29

## 2020-09-29 RX ORDER — NALOXONE HYDROCHLORIDE NASAL 4 MG/.1ML
4 SPRAY NASAL
Qty: 2 | Refills: 0 | Status: ACTIVE | COMMUNITY
Start: 2020-08-24

## 2020-09-29 RX ORDER — TOPIRAMATE 25 MG/1
25 TABLET, FILM COATED ORAL
Qty: 60 | Refills: 0 | Status: ACTIVE | COMMUNITY
Start: 2020-08-10

## 2020-09-29 RX ORDER — NALOXONE HYDROCHLORIDE NASAL 4 MG/.1ML
4 SPRAY NASAL
Qty: 2 | Refills: 0 | Status: DISCONTINUED | COMMUNITY
Start: 2020-08-24 | End: 2020-09-29

## 2020-09-29 RX ORDER — TERCONAZOLE 4 MG/G
0.4 CREAM VAGINAL
Qty: 45 | Refills: 0 | Status: ACTIVE | COMMUNITY
Start: 2020-08-31

## 2020-09-29 RX ORDER — ESCITALOPRAM OXALATE 10 MG/1
10 TABLET ORAL
Qty: 30 | Refills: 0 | Status: ACTIVE | COMMUNITY
Start: 2020-08-10

## 2020-09-29 RX ORDER — CIPROFLOXACIN HYDROCHLORIDE 500 MG/1
500 TABLET, FILM COATED ORAL
Qty: 14 | Refills: 0 | Status: DISCONTINUED | COMMUNITY
Start: 2020-08-31 | End: 2020-09-29

## 2020-09-29 RX ORDER — PANTOPRAZOLE 40 MG/1
40 TABLET, DELAYED RELEASE ORAL
Qty: 30 | Refills: 0 | Status: DISCONTINUED | COMMUNITY
Start: 2020-07-22 | End: 2020-09-29

## 2020-09-29 RX ORDER — PANTOPRAZOLE 40 MG/1
40 TABLET, DELAYED RELEASE ORAL
Qty: 30 | Refills: 0 | Status: ACTIVE | COMMUNITY
Start: 2020-07-22

## 2020-09-29 RX ORDER — POLYETHYLENE GLYOCOL 3350, SODIUM CHLORIDE, SODIUM BICARBONATE AND POTASSIUM CHLORIDE 420; 11.2; 5.72; 1.48 G/4L; G/4L; G/4L; G/4L
420 POWDER, FOR SOLUTION NASOGASTRIC; ORAL
Qty: 1 | Refills: 0 | Status: DISCONTINUED | COMMUNITY
Start: 2020-08-07 | End: 2020-09-29

## 2020-09-29 NOTE — PROCEDURE
[Abn Imaging Study] : abnormal imaging study [Procedure Explained] : The procedure was explained [Allergies Reviewed] : allergies reviewed. [Risks] : Risks [Benefits] : benefits [Alternatives] : alternatives [Bleeding] : bleeding risk [Infection] : risk of infection [Consent Obtained] : written consent was obtained prior to the procedure and is detailed in the patient's record [Patient] : the patient [Bowel Prep Kit] : the patient took the appropriate bowel preparation kit as directed [Approved Diet Followed] : the patient avoided solid foods and adhered to the approved diet list for 24 hours prior to the procedure [Automated Blood Pressure Cuff] : automated blood pressure cuff [Cardiac Monitor] : cardiac monitor [Pulse Oximeter] : pulse oximeter [___ L/min Oxygen via NC] : [unfilled] ~Uliters/minute oxygen via nasal cannula [2] : 2 [Sedation Clearance] : the patient was cleared for moderate sedation [Performed By: ___] : Performed by:  FEMI [Propofol ___ mg IV] : Propofol [unfilled] ~Umg intravenously [Time started: ___] : Start Time:  [unfilled] [Prep Qualtiy: ___] : Prep Quality:  [unfilled] [Withdrawal Time: ___] : Withdrawal Time:  [unfilled] [Time Completed: ___] : Completion Time:  [unfilled] [Left Lateral Decubitus] : The patient was positioned in the left lateral decubitus position [Cecum (Landmarks/Transillum)] : and guided to the cecum which was identified by the anatomic landmarks of the appendiceal orifice and ileocecal valve and by transillumination in the right lower quadrant [Minimal Difficulty] : with minimal difficulty [Insufflated] : insufflated [Single Pass Needed] : after a single pass [Retroflex View] : a retroflex view of the rectum was performed [Normal] : Normal [Hemorrhoids] : hemorrhoids [Tolerated Well] : the patient tolerated the procedure well [Vital Signs Stable] : the vital signs were stable [No Complications] : There were no complications [Abnormal Rectum] : a normal rectum [External Hemorrhoids] : no external hemorrhoids [Patient Rotated Into Alternating Positions] : the patient was not rotated [de-identified] : Segmental colitis of transverse colon [de-identified] [id number] [de-identified] : Poor prep limited visualization of subtle lesions such as small polyps or diverticulosis. No colitis seen. No gross lesions identified. [de-identified] : See above comments. [de-identified] : See above comments. [de-identified] : See above comments. [de-identified] : See above comments. [de-identified] : See above comments. [de-identified] : Poor prep throughout the colon limited visualization +/or exclusion of small polyps or diverticulosis. Definitely no colitis seen especially with poor prep after bowel prep. No gross lesions identified.

## 2020-09-29 NOTE — PHYSICAL EXAM
[General Appearance - Alert] : alert [General Appearance - In No Acute Distress] : in no acute distress [General Appearance - Well Nourished] : well nourished [General Appearance - Well Developed] : well developed [General Appearance - Well-Appearing] : healthy appearing [Sclera] : the sclera and conjunctiva were normal [PERRL With Normal Accommodation] : pupils were equal in size, round, and reactive to light [Extraocular Movements] : extraocular movements were intact [Outer Ear] : the ears and nose were normal in appearance [Examination Of The Oral Cavity] : the lips and gums were normal [Oropharynx] : the oropharynx was normal [Neck Appearance] : the appearance of the neck was normal [Neck Cervical Mass (___cm)] : no neck mass was observed [Jugular Venous Distention Increased] : there was no jugular-venous distention [Thyroid Diffuse Enlargement] : the thyroid was not enlarged [Thyroid Nodule] : there were no palpable thyroid nodules [Auscultation Breath Sounds / Voice Sounds] : lungs were clear to auscultation bilaterally [Heart Rate And Rhythm] : heart rate was normal and rhythm regular [Heart Sounds] : normal S1 and S2 [Heart Sounds Gallop] : no gallops [Murmurs] : no murmurs [Heart Sounds Pericardial Friction Rub] : no pericardial rub [Bowel Sounds] : normal bowel sounds [Abdomen Soft] : soft [Abdomen Tenderness] : non-tender [] : no hepato-splenomegaly [Abdomen Mass (___ Cm)] : no abdominal mass palpated [No CVA Tenderness] : no ~M costovertebral angle tenderness [Abnormal Walk] : normal gait [Musculoskeletal - Swelling] : no joint swelling seen [Skin Color & Pigmentation] : normal skin color and pigmentation [Skin Turgor] : normal skin turgor [Oriented To Time, Place, And Person] : oriented to person, place, and time [FreeTextEntry1] : done in the hospital

## 2020-09-29 NOTE — PROCEDURE
[Abn Imaging Study] : abnormal imaging study [Procedure Explained] : The procedure was explained [Allergies Reviewed] : allergies reviewed. [Risks] : Risks [Benefits] : benefits [Alternatives] : alternatives [Bleeding] : bleeding risk [Infection] : risk of infection [Consent Obtained] : written consent was obtained prior to the procedure and is detailed in the patient's record [Patient] : the patient [Bowel Prep Kit] : the patient took the appropriate bowel preparation kit as directed [Approved Diet Followed] : the patient avoided solid foods and adhered to the approved diet list for 24 hours prior to the procedure [Automated Blood Pressure Cuff] : automated blood pressure cuff [Cardiac Monitor] : cardiac monitor [Pulse Oximeter] : pulse oximeter [___ L/min Oxygen via NC] : [unfilled] ~Uliters/minute oxygen via nasal cannula [2] : 2 [Sedation Clearance] : the patient was cleared for moderate sedation [Performed By: ___] : Performed by:  FEMI [Propofol ___ mg IV] : Propofol [unfilled] ~Umg intravenously [Time started: ___] : Start Time:  [unfilled] [Prep Qualtiy: ___] : Prep Quality:  [unfilled] [Withdrawal Time: ___] : Withdrawal Time:  [unfilled] [Time Completed: ___] : Completion Time:  [unfilled] [Left Lateral Decubitus] : The patient was positioned in the left lateral decubitus position [Cecum (Landmarks/Transillum)] : and guided to the cecum which was identified by the anatomic landmarks of the appendiceal orifice and ileocecal valve and by transillumination in the right lower quadrant [Minimal Difficulty] : with minimal difficulty [Insufflated] : insufflated [Single Pass Needed] : after a single pass [Retroflex View] : a retroflex view of the rectum was performed [Normal] : Normal [Hemorrhoids] : hemorrhoids [Tolerated Well] : the patient tolerated the procedure well [Vital Signs Stable] : the vital signs were stable [No Complications] : There were no complications [Abnormal Rectum] : a normal rectum [External Hemorrhoids] : no external hemorrhoids [Patient Rotated Into Alternating Positions] : the patient was not rotated [de-identified] : Segmental colitis of transverse colon [de-identified] : [facility] [id number] [de-identified] : Poor prep limited visualization of subtle lesions such as small polyps or diverticulosis. No colitis seen. No gross lesions identified. [de-identified] : See above comments. [de-identified] : See above comments. [de-identified] : See above comments. [de-identified] : See above comments. [de-identified] : See above comments. [de-identified] : Poor prep throughout the colon limited visualization +/or exclusion of small polyps or diverticulosis. Definitely no colitis seen especially with poor prep after bowel prep. No gross lesions identified.

## 2020-09-29 NOTE — REASON FOR VISIT
[Procedure: _________] : a [unfilled] procedure visit [Colonoscopy] : a colonoscopy [FreeTextEntry2] : Pt is here for prior CT scan showing segmental transverse colon colitis.

## 2020-11-04 ENCOUNTER — RX RENEWAL (OUTPATIENT)
Age: 55
End: 2020-11-04

## 2021-12-31 NOTE — DISCHARGE NOTE PROVIDER - CARE PROVIDER_API CALL
Edward Bonilla  GASTROENTEROLOGY  [address] [place] [address]  Phone: [phone]  Fax: [phone]  Follow Up Time: 1 week    Yonis Sher)  Hematology; Internal Medicine; Medical Oncology  [address] [place] [address]  Phone: [phone]  Fax: [phone]  Follow Up Time: 1 week
with patient

## 2023-03-15 ENCOUNTER — APPOINTMENT (OUTPATIENT)
Dept: RHEUMATOLOGY | Facility: CLINIC | Age: 58
End: 2023-03-15

## 2023-06-05 ENCOUNTER — APPOINTMENT (OUTPATIENT)
Dept: RHEUMATOLOGY | Facility: CLINIC | Age: 58
End: 2023-06-05

## 2024-11-22 NOTE — ASSESSMENT
[FreeTextEntry1] : Repeat colonoscopy in one year.
[FreeTextEntry1] : Repeat colonoscopy in one year.
Statement Selected

## 2025-01-03 NOTE — ED PROVIDER NOTE - NO SIGNIFICANT PAST SURGICAL HISTORY
Discussed with Dr Michel call received from office regarding 31 wk patient that fell onto side of abd and hit her head.  Dr Michel advises patient to go to main ER due to hitting her head.       Patient called and informed to go to main ER instead of OB ED in labor and delivery because she hit her head and we will monitor baby there.  Patient agreeable to plan.    <<----- Click to add NO significant Past Surgical History